# Patient Record
Sex: FEMALE | Race: WHITE | Employment: UNEMPLOYED | URBAN - METROPOLITAN AREA
[De-identification: names, ages, dates, MRNs, and addresses within clinical notes are randomized per-mention and may not be internally consistent; named-entity substitution may affect disease eponyms.]

---

## 2017-01-16 ENCOUNTER — ALLSCRIPTS OFFICE VISIT (OUTPATIENT)
Dept: OTHER | Facility: OTHER | Age: 57
End: 2017-01-16

## 2017-01-16 DIAGNOSIS — Z12.11 ENCOUNTER FOR SCREENING FOR MALIGNANT NEOPLASM OF COLON: ICD-10-CM

## 2017-02-03 ENCOUNTER — HOSPITAL ENCOUNTER (OUTPATIENT)
Dept: RADIOLOGY | Facility: HOSPITAL | Age: 57
Discharge: HOME | End: 2017-02-03

## 2017-02-03 ENCOUNTER — TRANSCRIBE ORDERS (OUTPATIENT)
Dept: ADMINISTRATIVE | Facility: HOSPITAL | Age: 57
End: 2017-02-03

## 2017-02-03 DIAGNOSIS — S22.41XA CLOSED FRACTURE OF MULTIPLE RIBS OF RIGHT SIDE, INITIAL ENCOUNTER: ICD-10-CM

## 2017-02-03 DIAGNOSIS — S22.41XA CLOSED FRACTURE OF MULTIPLE RIBS OF RIGHT SIDE, INITIAL ENCOUNTER: Primary | ICD-10-CM

## 2017-02-03 PROCEDURE — 71020 HB CHEST X-RAY 2VW FRONTAL&LATL: CPT

## 2017-02-09 ENCOUNTER — ALLSCRIPTS OFFICE VISIT (OUTPATIENT)
Dept: OTHER | Facility: OTHER | Age: 57
End: 2017-02-09

## 2017-04-23 ENCOUNTER — APPOINTMENT (OUTPATIENT)
Dept: URGENT CARE | Age: 57
End: 2017-04-23
Payer: COMMERCIAL

## 2017-04-23 PROCEDURE — 99283 EMERGENCY DEPT VISIT LOW MDM: CPT | Performed by: FAMILY MEDICINE

## 2017-04-23 PROCEDURE — G0382 LEV 3 HOSP TYPE B ED VISIT: HCPCS | Performed by: FAMILY MEDICINE

## 2017-05-10 ENCOUNTER — APPOINTMENT (OUTPATIENT)
Dept: PHYSICAL THERAPY | Facility: CLINIC | Age: 57
End: 2017-05-10
Payer: COMMERCIAL

## 2017-05-10 PROCEDURE — 97162 PT EVAL MOD COMPLEX 30 MIN: CPT

## 2017-05-10 PROCEDURE — 97110 THERAPEUTIC EXERCISES: CPT

## 2017-05-15 ENCOUNTER — APPOINTMENT (OUTPATIENT)
Dept: PHYSICAL THERAPY | Facility: CLINIC | Age: 57
End: 2017-05-15
Payer: COMMERCIAL

## 2017-05-15 PROCEDURE — 97140 MANUAL THERAPY 1/> REGIONS: CPT

## 2017-05-15 PROCEDURE — 97110 THERAPEUTIC EXERCISES: CPT

## 2017-05-17 ENCOUNTER — APPOINTMENT (OUTPATIENT)
Dept: PHYSICAL THERAPY | Facility: CLINIC | Age: 57
End: 2017-05-17
Payer: COMMERCIAL

## 2017-05-17 PROCEDURE — 97110 THERAPEUTIC EXERCISES: CPT

## 2017-05-22 ENCOUNTER — APPOINTMENT (OUTPATIENT)
Dept: PHYSICAL THERAPY | Facility: CLINIC | Age: 57
End: 2017-05-22
Payer: COMMERCIAL

## 2017-05-24 ENCOUNTER — APPOINTMENT (OUTPATIENT)
Dept: PHYSICAL THERAPY | Facility: CLINIC | Age: 57
End: 2017-05-24
Payer: COMMERCIAL

## 2017-05-31 ENCOUNTER — APPOINTMENT (OUTPATIENT)
Dept: PHYSICAL THERAPY | Facility: CLINIC | Age: 57
End: 2017-05-31
Payer: COMMERCIAL

## 2017-07-26 ENCOUNTER — ALLSCRIPTS OFFICE VISIT (OUTPATIENT)
Dept: OTHER | Facility: OTHER | Age: 57
End: 2017-07-26

## 2017-07-26 DIAGNOSIS — G89.29 OTHER CHRONIC PAIN: ICD-10-CM

## 2017-07-26 DIAGNOSIS — Z12.31 ENCOUNTER FOR SCREENING MAMMOGRAM FOR MALIGNANT NEOPLASM OF BREAST: ICD-10-CM

## 2017-07-26 DIAGNOSIS — F32.9 MAJOR DEPRESSIVE DISORDER, SINGLE EPISODE: ICD-10-CM

## 2017-07-26 DIAGNOSIS — M81.0 AGE-RELATED OSTEOPOROSIS WITHOUT CURRENT PATHOLOGICAL FRACTURE: ICD-10-CM

## 2017-09-12 DIAGNOSIS — Z00.00 ENCOUNTER FOR GENERAL ADULT MEDICAL EXAMINATION WITHOUT ABNORMAL FINDINGS: ICD-10-CM

## 2017-09-15 ENCOUNTER — ALLSCRIPTS OFFICE VISIT (OUTPATIENT)
Dept: OTHER | Facility: OTHER | Age: 57
End: 2017-09-15

## 2017-09-18 ENCOUNTER — ALLSCRIPTS OFFICE VISIT (OUTPATIENT)
Dept: OTHER | Facility: OTHER | Age: 57
End: 2017-09-18

## 2017-10-24 ENCOUNTER — HOSPITAL ENCOUNTER (OUTPATIENT)
Dept: RADIOLOGY | Age: 57
Discharge: HOME | End: 2017-10-24
Payer: COMMERCIAL

## 2017-10-24 DIAGNOSIS — Z12.31 ENCOUNTER FOR SCREENING MAMMOGRAM FOR MALIGNANT NEOPLASM OF BREAST: ICD-10-CM

## 2017-10-24 DIAGNOSIS — M81.0 AGE-RELATED OSTEOPOROSIS WITHOUT CURRENT PATHOLOGICAL FRACTURE: ICD-10-CM

## 2017-10-24 PROCEDURE — G0202 SCR MAMMO BI INCL CAD: HCPCS

## 2017-10-24 PROCEDURE — 77080 DXA BONE DENSITY AXIAL: CPT

## 2018-01-12 VITALS
OXYGEN SATURATION: 97 % | HEART RATE: 100 BPM | WEIGHT: 130.5 LBS | DIASTOLIC BLOOD PRESSURE: 80 MMHG | HEIGHT: 62 IN | SYSTOLIC BLOOD PRESSURE: 130 MMHG | BODY MASS INDEX: 24.01 KG/M2

## 2018-01-14 VITALS
DIASTOLIC BLOOD PRESSURE: 72 MMHG | WEIGHT: 118 LBS | HEIGHT: 62 IN | SYSTOLIC BLOOD PRESSURE: 132 MMHG | OXYGEN SATURATION: 97 % | BODY MASS INDEX: 21.71 KG/M2 | HEART RATE: 74 BPM
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
## 2018-01-16 NOTE — PROGRESS NOTES
Chief Complaint  ppd admin      Active Problems    1  Alcohol withdrawal seizure (291 81,780 39) (F10 239,R56 9)   2  Cervical transverse process fracture (805 00) (S12 9XXA)   3  Depression (311) (F32 9)   4  Encounter for mammogram to establish baseline mammogram (V76 12) (Z12 31)   5  Encounter for screening colonoscopy (V76 51) (Z12 11)   6  Grief (309 0) (F43 20)   7  Osteoporosis (733 00) (M81 0)   8  Other chronic pain (338 29) (G89 29)   9  Panic disorder without agoraphobia (300 01) (F41 0)   10  Rib fractures (807 00) (S22 39XA)   11  Shingles (053 9) (B02 9)   12  Tobacco use (305 1) (Z72 0)    Current Meds   1  Ibuprofen 800 MG Oral Tablet; TAKE ONE TABLET BY MOUTH THREE TIMES DAILY   AFTER  MEALS; Therapy: 84WMC6028 to (Evaluate:14Oct2017)  Requested for: 19Xrf6843; Last   Rx:41Fnv4584; Status: ACTIVE - Transmit to Pharmacy - Awaiting Verification Ordered   2  Lidocaine 5 % External Ointment; APPLY TO AFFECTED AREAS AS DIRECTED; Therapy: 21XWC5285 to (Last Rx:61Kgj9775)  Requested for: 52FQH8261 Ordered   3  Methocarbamol 500 MG Oral Tablet; TAKE 2 TABLETS 4 TIMES DAILY AS NEEDED; Therapy: 15CFY7441 to (Evaluate:13Nov2017)  Requested for: 22Oxb5302; Last   Rx:25Haw5876; Status: ACTIVE - Transmit to Pharmacy - Awaiting Verification Ordered   4  Sertraline HCl - 100 MG Oral Tablet; TAKE ONE tablet(s) DAILY as directed; Therapy: 80LXY4018 to (Evaluate:19Oct2017)  Requested for: 57Opq2872; Last   Rx:60Zxa7812 Ordered    Allergies    1  No Known Drug Allergies    Future Appointments    Date/Time Provider Specialty Site   01/29/2018 09:20 BRANDO Cottrell   Internal Medicine [facility]   09/18/2017 09:30 AM YURIDIA Riley Nurse Schedule  [facility]     Signatures   Electronically signed by : BRANDO Rojas ; Sep 15 2017  1:34PM EST                       (Author)

## 2018-01-18 NOTE — PROGRESS NOTES
Chief Complaint  negative ppd read      Active Problems    1  Alcohol withdrawal seizure (291 81,780 39) (F10 239,R56 9)   2  Cervical transverse process fracture (805 00) (S12 9XXA)   3  Depression (311) (F32 9)   4  Diphtheria-pertussis-tetanus (DPT) vaccination status unknown (V49 89) (Z78 9)   5  Encounter for mammogram to establish baseline mammogram (V76 12) (Z12 31)   6  Encounter for screening colonoscopy (V76 51) (Z12 11)   7  Grief (309 0) (F43 20)   8  Osteoporosis (733 00) (M81 0)   9  Other chronic pain (338 29) (G89 29)   10  Panic disorder without agoraphobia (300 01) (F41 0)   11  Rib fractures (807 00) (S22 39XA)   12  Shingles (053 9) (B02 9)   13  Tobacco use (305 1) (Z72 0)    Current Meds   1  Ibuprofen 800 MG Oral Tablet; TAKE ONE TABLET BY MOUTH THREE TIMES DAILY   AFTER  MEALS; Therapy: 10ZVM5984 to (Evaluate:14Oct2017)  Requested for: 87Ipc2530; Last   Rx:10Vva1925; Status: ACTIVE - Transmit to Pharmacy - Awaiting Verification Ordered   2  Lidocaine 5 % External Ointment; APPLY TO AFFECTED AREAS AS DIRECTED; Therapy: 77OAH3109 to (Last Rx:30Hlp2217)  Requested for: 34PWC9891 Ordered   3  Methocarbamol 500 MG Oral Tablet; TAKE 2 TABLETS 4 TIMES DAILY AS NEEDED; Therapy: 63IHA6716 to (Evaluate:13Nov2017)  Requested for: 31Vnw3928; Last   Rx:41Tdf4342; Status: ACTIVE - Transmit to Pharmacy - Awaiting Verification Ordered   4  Sertraline HCl - 100 MG Oral Tablet; TAKE ONE tablet(s) DAILY as directed; Therapy: 36OOM2687 to (Evaluate:19Oct2017)  Requested for: 99Hlx3427; Last   Rx:85Ipb1587 Ordered    Allergies    1  No Known Drug Allergies    Plan  Depression    · Sertraline HCl - 100 MG Oral Tablet; TAKE 1 AND 1/2 TABLETS AT BEDTIME  Diphtheria-pertussis-tetanus (DPT) vaccination status unknown    · Tubersol 5 UNIT/0 1ML Intradermal Solution    Future Appointments    Date/Time Provider Specialty Site   01/29/2018 09:20 BRANDO Prado   Internal Medicine [facility] Signatures   Electronically signed by : BRANDO Valdes ; Sep 19 2017  9:35PM EST                       (Author)

## 2018-01-22 VITALS
HEART RATE: 75 BPM | HEIGHT: 62 IN | BODY MASS INDEX: 24.11 KG/M2 | SYSTOLIC BLOOD PRESSURE: 130 MMHG | DIASTOLIC BLOOD PRESSURE: 70 MMHG | OXYGEN SATURATION: 98 % | TEMPERATURE: 97 F | WEIGHT: 131 LBS

## 2018-01-29 ENCOUNTER — OFFICE VISIT (OUTPATIENT)
Dept: INTERNAL MEDICINE CLINIC | Facility: CLINIC | Age: 58
End: 2018-01-29

## 2018-01-30 DIAGNOSIS — R52 PAIN: Primary | ICD-10-CM

## 2018-01-30 RX ORDER — IBUPROFEN 800 MG/1
TABLET ORAL
Qty: 90 TABLET | Refills: 0 | Status: SHIPPED | OUTPATIENT
Start: 2018-01-30 | End: 2018-02-07 | Stop reason: SDUPTHER

## 2018-02-07 ENCOUNTER — OFFICE VISIT (OUTPATIENT)
Dept: URGENT CARE | Age: 58
End: 2018-02-07
Payer: COMMERCIAL

## 2018-02-07 VITALS
SYSTOLIC BLOOD PRESSURE: 188 MMHG | WEIGHT: 116 LBS | TEMPERATURE: 98.2 F | DIASTOLIC BLOOD PRESSURE: 86 MMHG | HEIGHT: 62 IN | RESPIRATION RATE: 18 BRPM | OXYGEN SATURATION: 97 % | BODY MASS INDEX: 21.35 KG/M2 | HEART RATE: 86 BPM

## 2018-02-07 DIAGNOSIS — M79.641 HAND PAIN, RIGHT: ICD-10-CM

## 2018-02-07 DIAGNOSIS — R52 PAIN: ICD-10-CM

## 2018-02-07 DIAGNOSIS — M25.531 WRIST PAIN, RIGHT: ICD-10-CM

## 2018-02-07 DIAGNOSIS — S52.591A OTHER CLOSED FRACTURE OF DISTAL END OF RIGHT RADIUS, INITIAL ENCOUNTER: Primary | ICD-10-CM

## 2018-02-07 PROBLEM — S52.501A CLOSED FRACTURE OF RIGHT DISTAL RADIUS: Status: ACTIVE | Noted: 2018-02-07

## 2018-02-07 PROCEDURE — 73130 X-RAY EXAM OF HAND: CPT

## 2018-02-07 PROCEDURE — 99283 EMERGENCY DEPT VISIT LOW MDM: CPT | Performed by: FAMILY MEDICINE

## 2018-02-07 PROCEDURE — 29125 APPL SHORT ARM SPLINT STATIC: CPT | Performed by: FAMILY MEDICINE

## 2018-02-07 PROCEDURE — 73110 X-RAY EXAM OF WRIST: CPT

## 2018-02-07 PROCEDURE — G0382 LEV 3 HOSP TYPE B ED VISIT: HCPCS | Performed by: FAMILY MEDICINE

## 2018-02-07 RX ORDER — IBUPROFEN 800 MG/1
800 TABLET ORAL
Qty: 90 TABLET | Refills: 0 | Status: SHIPPED | OUTPATIENT
Start: 2018-02-07 | End: 2018-03-06 | Stop reason: SDUPTHER

## 2018-02-07 RX ORDER — SERTRALINE HYDROCHLORIDE 100 MG/1
100 TABLET, FILM COATED ORAL
COMMUNITY
End: 2018-03-27 | Stop reason: SDUPTHER

## 2018-02-07 RX ORDER — AZITHROMYCIN 250 MG/1
TABLET, FILM COATED ORAL
COMMUNITY
Start: 2017-12-04 | End: 2018-03-06 | Stop reason: ALTCHOICE

## 2018-02-07 RX ORDER — OXYCODONE HYDROCHLORIDE AND ACETAMINOPHEN 5; 325 MG/1; MG/1
1 TABLET ORAL EVERY 6 HOURS PRN
Qty: 20 TABLET | Refills: 0 | Status: SHIPPED | OUTPATIENT
Start: 2018-02-07 | End: 2018-03-06 | Stop reason: ALTCHOICE

## 2018-02-07 RX ORDER — METHOCARBAMOL 500 MG/1
2 TABLET, FILM COATED ORAL 4 TIMES DAILY PRN
COMMUNITY
Start: 2017-03-02 | End: 2018-03-27 | Stop reason: SDUPTHER

## 2018-02-07 RX ORDER — IBUPROFEN 600 MG/1
600 TABLET ORAL EVERY 6 HOURS
COMMUNITY
Start: 2017-01-28 | End: 2018-03-06 | Stop reason: SDUPTHER

## 2018-02-07 NOTE — PATIENT INSTRUCTIONS
Wrist Fracture in Adults   WHAT YOU NEED TO KNOW:   A wrist fracture is a break in one or more of the bones in your wrist    DISCHARGE INSTRUCTIONS:   Return to the emergency department if:   · Your pain gets worse or does not get better after you take pain medicine  · Your cast or splint breaks, gets wet, or is damaged  · Your hand or fingers feel numb or cold  · Your hand or fingers turn white or blue  · Your splint or cast feels too tight  · You have more pain or swelling after the cast or splint is put on  Contact your healthcare provider if:   · You have a fever  · There is a foul smell or blood coming from under the cast     · You have questions or concerns about your condition or care  Medicines:   · Prescription pain medicine  may be given  Ask your healthcare provider how to take this medicine safely  Some prescription pain medicines contain acetaminophen  Do not take other medicines that contain acetaminophen without talking to your healthcare provider  Too much acetaminophen may cause liver damage  Prescription pain medicine may cause constipation  Ask your healthcare provider how to prevent or treat constipation  · NSAIDs , such as ibuprofen, help decrease swelling, pain, and fever  NSAIDs can cause stomach bleeding or kidney problems in certain people  If you take blood thinner medicine, always ask your healthcare provider if NSAIDs are safe for you  Always read the medicine label and follow directions  · Acetaminophen  decreases pain and fever  It is available without a doctor's order  Ask how much to take and how often to take it  Follow directions  Read the labels of all other medicines you are using to see if they also contain acetaminophen, or ask your doctor or pharmacist  Acetaminophen can cause liver damage if not taken correctly  Do not use more than 4 grams (4,000 milligrams) total of acetaminophen in one day  · Take your medicine as directed    Contact your healthcare provider if you think your medicine is not helping or if you have side effects  Tell him or her if you are allergic to any medicine  Keep a list of the medicines, vitamins, and herbs you take  Include the amounts, and when and why you take them  Bring the list or the pill bottles to follow-up visits  Carry your medicine list with you in case of an emergency  Self-care:   · Rest  as much as possible  Do not play contact sports until the healthcare provider says it is okay  · Apply ice  on your wrist for 15 to 20 minutes every hour or as directed  Use an ice pack, or put crushed ice in a plastic bag  Cover it with a towel before you place it on your skin  Ice helps prevent tissue damage and decreases swelling and pain  · Elevate  your wrist above the level of your heart as often as possible  This will help decrease swelling and pain  Prop your wrist on pillows or blankets to keep it elevated comfortably  Cast or splint care:   · You may take a bath or shower as directed  Do not let your cast or splint get wet  Before bathing, cover the cast or splint with 2 plastic trash bags  Tape the bags to your skin above the cast or splint to seal out the water  Keep your arm out of the water in case the bag breaks  If a plaster cast gets wet and soft, call your healthcare provider  · Check the skin around the cast or splint every day  You may put lotion on any red or sore areas  · Do not push down or lean on the cast or brace because it may break  · Do not  scratch the skin under the cast by putting a sharp or pointed object inside the cast   Go to physical therapy as directed: You may need physical therapy after your wrist heals and the cast is removed  A physical therapist can teach you exercises to help improve movement and strength and to decrease pain  Follow up with your healthcare provider or bone specialist as directed: You may need to return to have your cast removed   You may also need an x-ray to check how well the bone has healed  Write down your questions so you remember to ask them during your visits  © 2017 [employer] Information is for End User's use only and may not be sold, redistributed or otherwise used for commercial purposes  All illustrations and images included in CareNotes® are the copyrighted property of A D A M , Inc  or [employer]  The above information is an  only  It is not intended as medical advice for individual conditions or treatments  Talk to your doctor, nurse or pharmacist before following any medical regimen to see if it is safe and effective for you  Call [facility] [address] in the morning for a follow-up appointment   [phone]     Ice and elevate frequently

## 2018-02-07 NOTE — PROGRESS NOTES
[facility]        NAME: Tmi Kenyon is a 62 y o  female  : 1960    MRN: [id number]  DATE: 2018  TIME: 4:16 PM    Assessment and Plan   Hand pain, right [M79 641]  1  Hand pain, right  X-ray hand right 3+ views   2  Wrist pain, right  XR wrist 3+ vw right         Patient Instructions     Follow up with PCP in 3-5 days  Proceed to  ER if symptoms worsen  Chief Complaint     Chief Complaint   Patient presents with    Wrist Injury     she trip over and fell down hurt right wrist  it happen today  History of Present Illness   Tim Kenyon presents to the clinic c/o    Patient tripped and fell at home landing on her sofa and came down on her right outstretched arm and heard a crack on her right wrist   Patient has pain in the hand and wrist   She does have a history of osteoporosis and is concerned about possible fracture  Review of Systems   Review of Systems   Constitutional: Negative  Musculoskeletal: Positive for joint swelling  Skin: Negative  Neurological: Negative for numbness           Current Medications     Long-Term Prescriptions   Medication Sig Dispense Refill    ibuprofen (MOTRIN) 600 mg tablet Take 600 mg by mouth every 6 (six) hours      ibuprofen (MOTRIN) 800 mg tablet TAKE ONE TABLET BY MOUTH THREE TIMES DAILY AFTER  MEALS 90 tablet 0    methocarbamol (ROBAXIN) 500 mg tablet Take 2 tablets by mouth 4 (four) times a day as needed      sertraline (ZOLOFT) 100 mg tablet Take 100 mg by mouth         Current Allergies     Allergies as of 2018    (No Known Allergies)            The following portions of the patient's history were reviewed and updated as appropriate: allergies, current medications, past family history, past medical history, past social history, past surgical history and problem list     Objective   BP (!) 188/86   Pulse 86   Temp 98 2 °F (36 8 °C) (Temporal)   Resp 18   Ht 5' 2" (1 575 m)   Wt 52 6 kg (116 lb) LMP  (LMP Unknown)   SpO2 97%   BMI 21 22 kg/m²        Physical Exam     Physical Exam   Constitutional: She is oriented to person, place, and time  She appears well-developed and well-nourished  Musculoskeletal: She exhibits tenderness (Right hand and right wrist)  Patient with limited range of motion of the right hand and wrist due to pain with motion  There is mild focal soft tissue swelling  Neurological: She is alert and oriented to person, place, and time  Skin: Skin is warm and dry  Psychiatric: She has a normal mood and affect  Her behavior is normal    Nursing note and vitals reviewed  Splint application  Date/Time: 2/7/2018 5:02 PM  Performed by: Samantha Breath  Authorized by: Samantha Breath     Consent:     Consent obtained:  Verbal    Consent given by:  Patient    Risks discussed:  Swelling, pain and numbness  Pre-procedure details:     Sensation:  Normal  Procedure details:     Laterality:  Right    Location:  Wrist    Wrist:  R wrist    Splint type:  Short arm splint, static (forearm to hand)    Supplies:  Ortho-Glass and elastic bandage  Post-procedure details:     Pain:  Unchanged    Sensation:  Normal    Patient tolerance of procedure:   Tolerated well, no immediate complications

## 2018-02-12 ENCOUNTER — APPOINTMENT (OUTPATIENT)
Dept: RADIOLOGY | Facility: CLINIC | Age: 58
End: 2018-02-12
Payer: COMMERCIAL

## 2018-02-12 ENCOUNTER — OFFICE VISIT (OUTPATIENT)
Dept: OBGYN CLINIC | Facility: MEDICAL CENTER | Age: 58
End: 2018-02-12
Payer: COMMERCIAL

## 2018-02-12 VITALS — HEIGHT: 62 IN | SYSTOLIC BLOOD PRESSURE: 185 MMHG | DIASTOLIC BLOOD PRESSURE: 74 MMHG | HEART RATE: 80 BPM

## 2018-02-12 DIAGNOSIS — S52.501A CLOSED FRACTURE OF DISTAL END OF RIGHT RADIUS, UNSPECIFIED FRACTURE MORPHOLOGY, INITIAL ENCOUNTER: ICD-10-CM

## 2018-02-12 DIAGNOSIS — S62.101A CLOSED FRACTURE OF RIGHT WRIST, INITIAL ENCOUNTER: Primary | ICD-10-CM

## 2018-02-12 PROCEDURE — 25600 CLTX DST RDL FX/EPHYS SEP WO: CPT | Performed by: FAMILY MEDICINE

## 2018-02-12 PROCEDURE — 73110 X-RAY EXAM OF WRIST: CPT

## 2018-02-12 PROCEDURE — 99204 OFFICE O/P NEW MOD 45 MIN: CPT | Performed by: FAMILY MEDICINE

## 2018-02-12 NOTE — PATIENT INSTRUCTIONS
Start short-arm cast  reviewed cast precautions including instruction not to wet cast  instructed if any swelling, pain, numbness, tingling then to contact office immediately for instruction or go to ER if physician unavailable  reviewed risks of SAC including wrist stiffness, skin breakdown, ulceration, risk of infection if wedging objects behind cast  patient expressed understanding and agreed to plan

## 2018-02-12 NOTE — LETTER
February 12, 2018     Patient: Ailynjoseph Ariane   YOB: 1960   Date of Visit: 2/12/2018       To Whom it May Concern:    Ruddy Henry is under my professional care  She was seen in my office on 2/12/2018  She is to not bear any weight or use right upper extremity  She may return to work on 02/18/2018  At that time I recommend work restrictions to include no use of right upper extremity  She is not to lift anything with her right hand or carrying with the right hand  If you have any questions or concerns, please don't hesitate to call           Sincerely,          Sulma Nickerson III, DO        CC: Gloria Thakkar

## 2018-02-12 NOTE — PROGRESS NOTES
1  Closed fracture of right wrist, initial encounter  XR wrist 3+ vw right   2  Closed fracture of distal end of right radius, unspecified fracture morphology, initial encounter       Orders Placed This Encounter   Procedures    XR wrist 3+ vw right        Imaging Studies (I personally reviewed results in PACS):  X-ray right wrist 02/07/2018:  Nondisplaced transverse fracture distal radius  X-ray right wrist 02/12/2018:  Maintenance of alignment of nondisplaced transverse fracture distal radius    IMPRESSION:  Right hand dominant  Right distal radius fracture nondisplaced  Date of injury 02/07/2018  Date of immobilization:  02/07/2018 or urgent care splint  Follow-up interval:  5 days          Return in about 2 weeks (around 2/26/2018)  Patient Instructions   Start short-arm cast  reviewed cast precautions including instruction not to wet cast  instructed if any swelling, pain, numbness, tingling then to contact office immediately for instruction or go to ER if physician unavailable  reviewed risks of SAC including wrist stiffness, skin breakdown, ulceration, risk of infection if wedging objects behind cast  patient expressed understanding and agreed to plan  Recommend patient continue her vitamin-D and calcium supplements  Educated patient risk of delayed healing with continued smoking        CHIEF COMPLAINT:  Follow-up right wrist fracture    HPI:  Christin Taylor is a 62 y o  female  who presents for follow-up from urgent care for right wrist fracture  X-ray from 02/07/2018 shows nondisplaced transverse fracture distal radius  Patient tripped at home plan forward on her couch with fall on outstretched hand heard a crack in her right wrist   She did present to urgent care where she was placed in a splint      today, patient states that her pain is relatively well controlled  She states that her swelling is significantly reduced since her fall    She does detail history of osteoporosis to me from taking infertility medication in the past   She has a history of multiple fragility fractures including in the recent last 1 year 3 compression fractures of her vertebrae as well as rib fractures  Review of Systems   Constitutional: Negative for chills, fever and unexpected weight change  HENT: Negative for hearing loss, nosebleeds and sore throat  Eyes: Negative for pain, redness and visual disturbance  Respiratory: Negative for cough, shortness of breath and wheezing  Cardiovascular: Negative for chest pain, palpitations and leg swelling  Gastrointestinal: Negative for abdominal distention, nausea and vomiting  Endocrine: Negative for polydipsia and polyuria  Genitourinary: Negative for dysuria and hematuria  Skin: Negative for rash and wound  Neurological: Negative for dizziness, numbness and headaches  Psychiatric/Behavioral: Negative for decreased concentration and suicidal ideas  Following history reviewed and update:    Past Medical History:   Diagnosis Date    History of endometriosis     Osteoporosis     Polycystic ovarian syndrome      Past Surgical History:   Procedure Laterality Date    SHOULDER SURGERY      TOTAL ABDOMINAL HYSTERECTOMY W/ BILATERAL SALPINGOOPHORECTOMY      TUBAL LIGATION       Social History   History   Alcohol Use    Yes     Comment: social     History   Drug Use No     History   Smoking Status    Current Every Day Smoker   Smokeless Tobacco    Never Used     Comment: tobacco use     Family History   Problem Relation Age of Onset    Peripheral vascular disease Mother     Other Mother      peripheral arterial disease     No Known Allergies       Physical Exam   Constitutional: She is oriented to person, place, and time  She appears well-developed and well-nourished  HENT:   Head: Normocephalic and atraumatic     Right Ear: External ear normal    Left Ear: External ear normal    Nose: Nose normal    Eyes: Conjunctivae are normal  Right eye exhibits no discharge  Left eye exhibits no discharge  No scleral icterus  Neck: Neck supple  Pulmonary/Chest: Effort normal  No respiratory distress  Neurological: She is alert and oriented to person, place, and time  Psychiatric: She has a normal mood and affect   Her behavior is normal  Judgment and thought content normal        Ortho Exam    Right Elbow:  no swelling, erythema, or increased warmth  rom full  nontender  no laxity of joint    Right Wrist  no erythema, or increased warmth  1+ swelling nonpitting  Positive tenderness but volar distal radius      Right Hand  no erythema  swelling:  None  tenderness:  None  rom fingers mcp, pip, dip intact without pain  No digital scissoring or deviation of fingers  no extensor lag  no rotation of fingers  no joint laxity  strenght flexion and extension mcp, pip, dip 5/5  sensation intact  capillary refill intact   Procedures

## 2018-02-27 ENCOUNTER — TELEPHONE (OUTPATIENT)
Dept: OBGYN CLINIC | Facility: HOSPITAL | Age: 58
End: 2018-02-27

## 2018-02-27 NOTE — TELEPHONE ENCOUNTER
Pt has an appt Friday but was asking if she could come in today or tomorrow but I told her that dr Charletta Burkitt is in [place] on those two days  She would like a call from the office   She can be reached at [phone]

## 2018-02-27 NOTE — TELEPHONE ENCOUNTER
I spoke with patient  She was requesting earlier appointment due to scheduling conflicts with her relatives care  Explained the patient I do want to see her so I can examine her wrist ensure that there is no issue  Patient expressed understanding agreed to make appointment on Friday

## 2018-03-02 ENCOUNTER — OFFICE VISIT (OUTPATIENT)
Dept: OBGYN CLINIC | Facility: MEDICAL CENTER | Age: 58
End: 2018-03-02

## 2018-03-02 ENCOUNTER — APPOINTMENT (OUTPATIENT)
Dept: RADIOLOGY | Facility: CLINIC | Age: 58
End: 2018-03-02
Payer: COMMERCIAL

## 2018-03-02 VITALS
HEART RATE: 74 BPM | DIASTOLIC BLOOD PRESSURE: 73 MMHG | SYSTOLIC BLOOD PRESSURE: 150 MMHG | HEIGHT: 62 IN | WEIGHT: 112 LBS | BODY MASS INDEX: 20.61 KG/M2

## 2018-03-02 DIAGNOSIS — S52.501D CLOSED FRACTURE OF DISTAL END OF RIGHT RADIUS WITH ROUTINE HEALING, UNSPECIFIED FRACTURE MORPHOLOGY, SUBSEQUENT ENCOUNTER: Primary | ICD-10-CM

## 2018-03-02 PROCEDURE — 99024 POSTOP FOLLOW-UP VISIT: CPT | Performed by: FAMILY MEDICINE

## 2018-03-02 PROCEDURE — 73110 X-RAY EXAM OF WRIST: CPT

## 2018-03-02 NOTE — LETTER
March 2, 2018     Patient: Rohini Ervin   YOB: 1960   Date of Visit: 3/2/2018       To Whom it May Concern:    Enrique Janny is under my professional care  She was seen in my office on 3/2/2018  She may return to work on In 3/2/18       If you have any questions or concerns, please don't hesitate to call           Sincerely,          Radha Valdez III,         CC: Rohini Ervin

## 2018-03-02 NOTE — PROGRESS NOTES
1  Closed fracture of distal end of right radius with routine healing, unspecified fracture morphology, subsequent encounter  XR wrist 3+ vw right     Orders Placed This Encounter   Procedures    XR wrist 3+ vw right        Imaging Studies (I personally reviewed results in PACS):  X-ray right wrist 03/02/2018:  Maintenance of alignment  There is interval sclerosis of fracture line with callus formation  Past diagnostics:    X-ray right wrist 02/07/2018:  Nondisplaced transverse fracture distal radius  X-ray right wrist 02/12/2018:  Maintenance of alignment of nondisplaced transverse fracture distal radius    IMPRESSION:  Right hand dominant  Right distal radius fracture nondisplaced  Date of injury 02/07/2018  Date of immobilization:  02/07/2018 or urgent care splint  Follow-up interval:  3-4 weeks    Planned to have repeat x-rays next visit and transition to a brace        Return in about 2 weeks (around 3/16/2018)  Patient Instructions   Patient continue cast at this time   Educated patient risk of displacement of fracture she continues to use her arm  Explained that if her fracture moves then can result  stiffness in her wrist that may require surgery to fix  Patient expressed understanding agreed to plan  Recommend patient continue her vitamin-D and calcium supplements  Again Educated patient risk of delayed healing with continued smoking        CHIEF COMPLAINT:  Follow-up right wrist fracture    HPI:  Kay Faye is a 62 y o  female  who presents for follow-up from urgent care for right wrist fracture  X-ray from 02/07/2018 shows nondisplaced transverse fracture distal radius  Patient tripped at home plan forward on her couch with fall on outstretched hand heard a crack in her right wrist   She did present to urgent care where she was placed in a splint    Last visit patient was started on short-arm cast   She continues work restriction     today, states she has only pain at the end of work characterizes throbbing pain inside her cast   She 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  She has some stiffness in her wrist       Review of Systems   Constitutional: Negative for fever  Neurological: Negative for weakness  Following history reviewed and update:    Past Medical History:   Diagnosis Date    History of endometriosis     Osteoporosis     Polycystic ovarian syndrome      Past Surgical History:   Procedure Laterality Date    SHOULDER SURGERY      TOTAL ABDOMINAL HYSTERECTOMY W/ BILATERAL SALPINGOOPHORECTOMY      TUBAL LIGATION       Social History   History   Alcohol Use    Yes     Comment: social     History   Drug Use No     History   Smoking Status    Current Every Day Smoker   Smokeless Tobacco    Never Used     Comment: tobacco use     Family History   Problem Relation Age of Onset    Peripheral vascular disease Mother     Other Mother      peripheral arterial disease     No Known Allergies       Physical Exam   Constitutional: She is oriented to person, place, and time  She appears well-developed and well-nourished  HENT:   Head: Normocephalic and atraumatic  Right Ear: External ear normal    Left Ear: External ear normal    Nose: Nose normal    Eyes: Conjunctivae are normal  Right eye exhibits no discharge  Left eye exhibits no discharge  No scleral icterus  Neck: Neck supple  Pulmonary/Chest: Effort normal  No respiratory distress  Neurological: She is alert and oriented to person, place, and time  Psychiatric: She has a normal mood and affect   Her behavior is normal  Judgment and thought content normal        Ortho Exam    Right Elbow:  no swelling, erythema, or increased warmth  rom full  nontender  no laxity of joint    Right Wrist  no erythema, or increased warmth  No swelling  Nontender wrist  Range-of-motion fifty extension      Right Hand  no erythema  swelling:  None  tenderness:  None  rom fingers mcp, pip, dip intact without pain  No digital scissoring or deviation of fingers  no extensor lag  no rotation of fingers  no joint laxity  strenght flexion and extension mcp, pip, dip 5/5  sensation intact  capillary refill intact   Procedures

## 2018-03-02 NOTE — PATIENT INSTRUCTIONS
Patient continue cast at this time   Educated patient risk of displacement of fracture she continues to use her arm  Explained that if her fracture moves then can result  stiffness in her wrist that may require surgery to fix  Patient expressed understanding agreed to plan

## 2018-03-06 ENCOUNTER — OFFICE VISIT (OUTPATIENT)
Dept: INTERNAL MEDICINE CLINIC | Facility: CLINIC | Age: 58
End: 2018-03-06
Payer: COMMERCIAL

## 2018-03-06 VITALS
SYSTOLIC BLOOD PRESSURE: 140 MMHG | BODY MASS INDEX: 21.57 KG/M2 | DIASTOLIC BLOOD PRESSURE: 70 MMHG | HEIGHT: 62 IN | HEART RATE: 75 BPM | RESPIRATION RATE: 16 BRPM | TEMPERATURE: 98.8 F | OXYGEN SATURATION: 98 % | WEIGHT: 117.2 LBS

## 2018-03-06 DIAGNOSIS — Z13.220 SCREENING, LIPID: ICD-10-CM

## 2018-03-06 DIAGNOSIS — M80.00XD OSTEOPOROSIS WITH CURRENT PATHOLOGICAL FRACTURE WITH ROUTINE HEALING, UNSPECIFIED OSTEOPOROSIS TYPE, SUBSEQUENT ENCOUNTER: Primary | ICD-10-CM

## 2018-03-06 DIAGNOSIS — R52 PAIN: ICD-10-CM

## 2018-03-06 PROCEDURE — 99214 OFFICE O/P EST MOD 30 MIN: CPT | Performed by: INTERNAL MEDICINE

## 2018-03-06 RX ORDER — MULTIVITAMIN
1 CAPSULE ORAL DAILY
COMMUNITY

## 2018-03-06 RX ORDER — IBUPROFEN 800 MG/1
800 TABLET ORAL
Qty: 90 TABLET | Refills: 0 | Status: SHIPPED | OUTPATIENT
Start: 2018-03-06 | End: 2018-04-17 | Stop reason: SDUPTHER

## 2018-03-06 NOTE — PROGRESS NOTES
Assessment/Plan:    No problem-specific Assessment & Plan notes found for this encounter  Diagnoses and all orders for this visit:    Osteoporosis with current pathological fracture with routine healing, unspecified osteoporosis type, subsequent encounter  -     Comprehensive metabolic panel  -     CBC and differential  -     TSH, 3rd generation with T4 reflex  -     Vitamin D 25 hydroxy; Future  -     PTH, intact; Future  -     Ambulatory referral to Rheumatology; Future    Screening, lipid  -     Lipid panel    Pain  -     ibuprofen (MOTRIN) 800 mg tablet; Take 1 tablet (800 mg total) by mouth 3 (three) times daily after meals    Other orders  -     Multiple Vitamin (MULTIVITAMIN) capsule; Take 1 capsule by mouth daily  -     cyanocobalamin 250 MCG tablet; Take 250 mcg by mouth daily      Valentin Aviles was seen and examined in the office today  With her known osteoporosis (last scan in 2017) along with multiple fractures (though traumatic), I do think it's a good idea for her to be seen by Rheumatology for options as well  Blood work was also requested to rule out secondary causes  There is a family history of breast and colon cancer and she was reminded to have her colonoscopy as well  No other changes were made  Subjective:      Patient ID: Gloria Thakkar is a 62 y o  female  Valentin Aviles is here today for a follow up  To summarize, she has known osteoporosis and has previously on a bisphosphonate from my understanding  She reports that she was told to stop it secondary to how long she was on it (assuming secondary to the finding of atypical fractures associated with long term use)  She has had multiple fractures in her past which have been traumatic in nature  She is currently suffering from a wrist fracture of the right wrist that occurred while falling forward   She has had previous cervical spine fracture, multiple rib fractures (secondary to trauma), and I believer another fracture involving the back as well    Denies a history of kidney stones, abdominal pain, or other complaints  The following portions of the patient's history were reviewed and updated as appropriate: current medications, past family history, past medical history, past social history, past surgical history and problem list     Review of Systems   Constitutional: Negative for chills, fever and unexpected weight change  Respiratory: Negative for cough, chest tightness and shortness of breath  Cardiovascular: Negative for chest pain and palpitations  Gastrointestinal: Negative for abdominal pain, diarrhea, nausea and vomiting  Genitourinary: Negative for difficulty urinating  Musculoskeletal: Positive for arthralgias  Neurological: Negative for dizziness, numbness and headaches  Psychiatric/Behavioral: Negative for dysphoric mood and sleep disturbance  The patient is not nervous/anxious  Objective:      /70 (BP Location: Left arm, Patient Position: Sitting, Cuff Size: Adult)   Pulse 75   Temp 98 8 °F (37 1 °C) (Tympanic)   Resp 16   Ht 5' 2" (1 575 m)   Wt 53 2 kg (117 lb 3 2 oz)   LMP  (LMP Unknown)   SpO2 98%   BMI 21 44 kg/m²          Physical Exam   Constitutional: She is oriented to person, place, and time  She appears well-developed and well-nourished  Eyes: Conjunctivae are normal    Cardiovascular: Normal rate, regular rhythm and normal heart sounds  Pulmonary/Chest: Effort normal and breath sounds normal  No respiratory distress  She has no wheezes  Musculoskeletal:   Right wrist in splint   Neurological: She is alert and oriented to person, place, and time  No cranial nerve deficit  Skin: Skin is warm and dry  Psychiatric: She has a normal mood and affect  Her speech is normal and behavior is normal  Judgment and thought content normal    Vitals reviewed

## 2018-03-09 LAB
25(OH)D3 SERPL-MCNC: 28 NG/ML (ref 30–100)
ALBUMIN SERPL-MCNC: 4.8 G/DL (ref 3.6–5.1)
ALBUMIN/GLOB SERPL: 1.7 (CALC) (ref 1–2.5)
ALP SERPL-CCNC: 77 U/L (ref 33–130)
ALT SERPL-CCNC: 12 U/L (ref 6–29)
AST SERPL-CCNC: 13 U/L (ref 10–35)
BASOPHILS # BLD AUTO: 31 CELLS/UL (ref 0–200)
BASOPHILS NFR BLD AUTO: 0.3 %
BILIRUB SERPL-MCNC: 0.5 MG/DL (ref 0.2–1.2)
BUN SERPL-MCNC: 19 MG/DL (ref 7–25)
BUN/CREAT SERPL: ABNORMAL (CALC) (ref 6–22)
CALCIUM SERPL-MCNC: 10.5 MG/DL (ref 8.6–10.4)
CALCIUM SERPL-MCNC: 10.5 MG/DL (ref 8.6–10.4)
CHLORIDE SERPL-SCNC: 102 MMOL/L (ref 98–110)
CHOLEST SERPL-MCNC: 265 MG/DL
CHOLEST/HDLC SERPL: 2.7 (CALC)
CO2 SERPL-SCNC: 26 MMOL/L (ref 20–31)
CREAT SERPL-MCNC: 0.76 MG/DL (ref 0.5–1.05)
EOSINOPHIL # BLD AUTO: 103 CELLS/UL (ref 15–500)
EOSINOPHIL NFR BLD AUTO: 1 %
ERYTHROCYTE [DISTWIDTH] IN BLOOD BY AUTOMATED COUNT: 12 % (ref 11–15)
GLOBULIN SER CALC-MCNC: 2.8 G/DL (CALC) (ref 1.9–3.7)
GLUCOSE SERPL-MCNC: 86 MG/DL (ref 65–99)
HCT VFR BLD AUTO: 44.7 % (ref 35–45)
HDLC SERPL-MCNC: 98 MG/DL
HGB BLD-MCNC: 15.1 G/DL (ref 11.7–15.5)
LDLC SERPL CALC-MCNC: 148 MG/DL (CALC)
LYMPHOCYTES # BLD AUTO: 2039 CELLS/UL (ref 850–3900)
LYMPHOCYTES NFR BLD AUTO: 19.8 %
MCH RBC QN AUTO: 31.7 PG (ref 27–33)
MCHC RBC AUTO-ENTMCNC: 33.8 G/DL (ref 32–36)
MCV RBC AUTO: 93.9 FL (ref 80–100)
MONOCYTES # BLD AUTO: 453 CELLS/UL (ref 200–950)
MONOCYTES NFR BLD AUTO: 4.4 %
NEUTROPHILS # BLD AUTO: 7674 CELLS/UL (ref 1500–7800)
NEUTROPHILS NFR BLD AUTO: 74.5 %
NONHDLC SERPL-MCNC: 167 MG/DL (CALC)
PLATELET # BLD AUTO: 353 THOUSAND/UL (ref 140–400)
PMV BLD REES-ECKER: 10.9 FL (ref 7.5–12.5)
POTASSIUM SERPL-SCNC: 4.3 MMOL/L (ref 3.5–5.3)
PROT SERPL-MCNC: 7.6 G/DL (ref 6.1–8.1)
PTH-INTACT SERPL-MCNC: 17 PG/ML (ref 14–64)
RBC # BLD AUTO: 4.76 MILLION/UL (ref 3.8–5.1)
SL AMB EGFR AFRICAN AMERICAN: 100 ML/MIN/1.73M2
SL AMB EGFR NON AFRICAN AMERICAN: 86 ML/MIN/1.73M2
SODIUM SERPL-SCNC: 138 MMOL/L (ref 135–146)
TRIGL SERPL-MCNC: 88 MG/DL
TSH SERPL-ACNC: 1.08 MIU/L (ref 0.4–4.5)
WBC # BLD AUTO: 10.3 THOUSAND/UL (ref 3.8–10.8)

## 2018-03-15 ENCOUNTER — TELEPHONE (OUTPATIENT)
Dept: INTERNAL MEDICINE CLINIC | Facility: CLINIC | Age: 58
End: 2018-03-15

## 2018-03-19 ENCOUNTER — APPOINTMENT (OUTPATIENT)
Dept: RADIOLOGY | Facility: CLINIC | Age: 58
End: 2018-03-19
Payer: COMMERCIAL

## 2018-03-19 ENCOUNTER — OFFICE VISIT (OUTPATIENT)
Dept: OBGYN CLINIC | Facility: MEDICAL CENTER | Age: 58
End: 2018-03-19

## 2018-03-19 VITALS — DIASTOLIC BLOOD PRESSURE: 94 MMHG | SYSTOLIC BLOOD PRESSURE: 173 MMHG | HEART RATE: 79 BPM

## 2018-03-19 DIAGNOSIS — S52.501D CLOSED FRACTURE OF DISTAL END OF RIGHT RADIUS WITH ROUTINE HEALING, UNSPECIFIED FRACTURE MORPHOLOGY, SUBSEQUENT ENCOUNTER: ICD-10-CM

## 2018-03-19 DIAGNOSIS — S52.501D CLOSED FRACTURE OF DISTAL END OF RIGHT RADIUS WITH ROUTINE HEALING, UNSPECIFIED FRACTURE MORPHOLOGY, SUBSEQUENT ENCOUNTER: Primary | ICD-10-CM

## 2018-03-19 PROCEDURE — 99024 POSTOP FOLLOW-UP VISIT: CPT | Performed by: FAMILY MEDICINE

## 2018-03-19 PROCEDURE — 73110 X-RAY EXAM OF WRIST: CPT

## 2018-03-19 NOTE — PATIENT INSTRUCTIONS
Start wrist brace  Start daily range-of-motion exercises outside of wrist brace  Weight bearing as tolerated  No heavy lifting  Weight resistance to be guided by physical therapy     Start PT for range of motion and gentle resistance to fingers and wrist

## 2018-03-19 NOTE — PROGRESS NOTES
1  Closed fracture of distal end of right radius with routine healing, unspecified fracture morphology, subsequent encounter       No orders of the defined types were placed in this encounter  Imaging Studies (I personally reviewed results in PACS):  X-ray right wrist 03/02/2018:  Maintenance of alignment  There is interval sclerosis of fracture line with callus formation  Past diagnostics:    X-ray right wrist 02/07/2018:  Nondisplaced transverse fracture distal radius  X-ray right wrist 02/12/2018:  Maintenance of alignment of nondisplaced transverse fracture distal radius    IMPRESSION:  Right hand dominant  Right distal radius fracture nondisplaced  Date of injury 02/07/2018  Date of immobilization:  02/07/2018 or urgent care splint  Follow-up interval:  6 weeks    Planned to have repeat x-rays next visit and transition to a brace      Return in about 3 weeks (around 4/9/2018)  Patient Instructions   Start wrist brace  Weight bearing as tolerated  Start PT for range of motion and gentle resistance to fingers and wrist     Recommend patient continue her vitamin-D and calcium supplements  Again Educated patient risk of delayed healing with continued smoking        CHIEF COMPLAINT:  Follow-up right wrist fracture    HPI:  Clifton Mortensen is a 62 y o  female  who presents for follow-up right distal radius fracture  Date of injury 02/07/2018  Last visit patient placed in short-arm cast     Today, patient states that since last visit she has been using her right arm to help with care for her disabled sister  She states she has not been lifting any heavy objects however she has been making bed than lifting her sister's pains after wrist room use  She is she has some mild soreness and points to her former extensors  She denies any pain or distal radius  She has he has occasional intermittent numbness in her thumb but none presently  Review of Systems   Constitutional: Negative for fever  Neurological: Negative for weakness  Following history reviewed and update:    Past Medical History:   Diagnosis Date    Depression 7/17/2012    History of endometriosis     Osteoporosis     Polycystic ovarian syndrome      Past Surgical History:   Procedure Laterality Date    SHOULDER SURGERY      TOTAL ABDOMINAL HYSTERECTOMY W/ BILATERAL SALPINGOOPHORECTOMY      TUBAL LIGATION       Social History   History   Alcohol Use    Yes     Comment: social     History   Drug Use No     History   Smoking Status    Current Every Day Smoker   Smokeless Tobacco    Never Used     Comment: tobacco use     Family History   Problem Relation Age of Onset    Peripheral vascular disease Mother     Other Mother      peripheral arterial disease    Breast cancer Sister     Breast cancer Maternal Aunt     Hashimoto's thyroiditis Sister      No Known Allergies       Physical Exam   Constitutional: She is oriented to person, place, and time  She appears well-developed and well-nourished  HENT:   Head: Normocephalic and atraumatic  Right Ear: External ear normal    Left Ear: External ear normal    Nose: Nose normal    Eyes: Conjunctivae are normal  Right eye exhibits no discharge  Left eye exhibits no discharge  No scleral icterus  Neck: Neck supple  Pulmonary/Chest: Effort normal  No respiratory distress  Neurological: She is alert and oriented to person, place, and time  Psychiatric: She has a normal mood and affect  Her behavior is normal  Judgment and thought content normal        Ortho Exam    Right Elbow:  no swelling, erythema, or increased warmth  rom full  nontender  no laxity of joint    Right Wrist  no erythema, or increased warmth  No swelling  Nontender wrist  Range-of-motion 45 extension    Twenty flexion      Right Hand  no erythema  swelling:  None  tenderness:  None  rom fingers mcp, pip, dip intact without pain  No digital scissoring or deviation of fingers  no extensor lag  no rotation of fingers  no joint laxity  strenght flexion and extension mcp, pip, dip 5/5  sensation intact    No thumb numbness today  capillary refill intact   Procedures

## 2018-03-27 DIAGNOSIS — F32.A DEPRESSION, UNSPECIFIED DEPRESSION TYPE: Primary | ICD-10-CM

## 2018-03-27 RX ORDER — SERTRALINE HYDROCHLORIDE 100 MG/1
150 TABLET, FILM COATED ORAL DAILY
Qty: 45 TABLET | Refills: 5 | Status: SHIPPED | OUTPATIENT
Start: 2018-03-27 | End: 2018-05-10 | Stop reason: SDUPTHER

## 2018-03-27 RX ORDER — METHOCARBAMOL 500 MG/1
1000 TABLET, FILM COATED ORAL 4 TIMES DAILY PRN
Qty: 120 TABLET | Refills: 1 | Status: SHIPPED | OUTPATIENT
Start: 2018-03-27 | End: 2018-04-17 | Stop reason: SDUPTHER

## 2018-04-17 DIAGNOSIS — F32.A DEPRESSION, UNSPECIFIED DEPRESSION TYPE: ICD-10-CM

## 2018-04-17 DIAGNOSIS — R52 PAIN: ICD-10-CM

## 2018-04-17 RX ORDER — IBUPROFEN 800 MG/1
800 TABLET ORAL
Qty: 90 TABLET | Refills: 3 | Status: SHIPPED | OUTPATIENT
Start: 2018-04-17 | End: 2018-10-04 | Stop reason: SDUPTHER

## 2018-04-17 RX ORDER — METHOCARBAMOL 500 MG/1
1000 TABLET, FILM COATED ORAL 4 TIMES DAILY PRN
Qty: 120 TABLET | Refills: 0 | Status: SHIPPED | OUTPATIENT
Start: 2018-04-17 | End: 2018-05-16 | Stop reason: SDUPTHER

## 2018-05-02 DIAGNOSIS — R52 PAIN: ICD-10-CM

## 2018-05-02 DIAGNOSIS — F32.A DEPRESSION, UNSPECIFIED DEPRESSION TYPE: ICD-10-CM

## 2018-05-08 RX ORDER — SERTRALINE HYDROCHLORIDE 100 MG/1
TABLET, FILM COATED ORAL
Qty: 135 TABLET | Refills: 0 | Status: CANCELLED | OUTPATIENT
Start: 2018-05-08

## 2018-05-08 RX ORDER — METHOCARBAMOL 500 MG/1
1000 TABLET, FILM COATED ORAL 4 TIMES DAILY PRN
Qty: 120 TABLET | Refills: 0 | Status: CANCELLED | OUTPATIENT
Start: 2018-05-08

## 2018-05-10 DIAGNOSIS — F32.A DEPRESSION, UNSPECIFIED DEPRESSION TYPE: ICD-10-CM

## 2018-05-10 RX ORDER — SERTRALINE HYDROCHLORIDE 100 MG/1
150 TABLET, FILM COATED ORAL DAILY
Qty: 45 TABLET | Refills: 5 | Status: SHIPPED | OUTPATIENT
Start: 2018-05-10 | End: 2018-10-04 | Stop reason: SDUPTHER

## 2018-05-16 DIAGNOSIS — F32.A DEPRESSION, UNSPECIFIED DEPRESSION TYPE: ICD-10-CM

## 2018-05-17 DIAGNOSIS — F32.A DEPRESSION, UNSPECIFIED DEPRESSION TYPE: ICD-10-CM

## 2018-05-17 RX ORDER — METHOCARBAMOL 500 MG/1
1000 TABLET, FILM COATED ORAL 4 TIMES DAILY PRN
Qty: 120 TABLET | Refills: 0 | Status: SHIPPED | OUTPATIENT
Start: 2018-05-17 | End: 2018-06-09 | Stop reason: SDUPTHER

## 2018-05-17 RX ORDER — METHOCARBAMOL 500 MG/1
1000 TABLET, FILM COATED ORAL 4 TIMES DAILY PRN
Qty: 120 TABLET | Refills: 0 | Status: CANCELLED | OUTPATIENT
Start: 2018-05-17

## 2018-05-21 RX ORDER — IBUPROFEN 800 MG/1
TABLET ORAL
Qty: 90 TABLET | Refills: 0 | OUTPATIENT
Start: 2018-05-21

## 2018-06-09 DIAGNOSIS — F32.A DEPRESSION, UNSPECIFIED DEPRESSION TYPE: ICD-10-CM

## 2018-06-11 RX ORDER — METHOCARBAMOL 500 MG/1
TABLET, FILM COATED ORAL
Qty: 120 TABLET | Refills: 0 | Status: SHIPPED | OUTPATIENT
Start: 2018-06-11 | End: 2018-07-11 | Stop reason: SDUPTHER

## 2018-06-27 ENCOUNTER — OFFICE VISIT (OUTPATIENT)
Dept: OBGYN CLINIC | Facility: MEDICAL CENTER | Age: 58
End: 2018-06-27
Payer: COMMERCIAL

## 2018-06-27 VITALS
BODY MASS INDEX: 21.16 KG/M2 | HEIGHT: 62 IN | WEIGHT: 115 LBS | SYSTOLIC BLOOD PRESSURE: 150 MMHG | DIASTOLIC BLOOD PRESSURE: 70 MMHG | HEART RATE: 79 BPM

## 2018-06-27 DIAGNOSIS — S80.212A ABRASION, KNEE, LEFT, INITIAL ENCOUNTER: ICD-10-CM

## 2018-06-27 DIAGNOSIS — S82.045A CLOSED NONDISPLACED COMMINUTED FRACTURE OF LEFT PATELLA, INITIAL ENCOUNTER: Primary | ICD-10-CM

## 2018-06-27 DIAGNOSIS — M25.462 EFFUSION OF LEFT KNEE: ICD-10-CM

## 2018-06-27 PROCEDURE — 99214 OFFICE O/P EST MOD 30 MIN: CPT | Performed by: FAMILY MEDICINE

## 2018-06-27 RX ORDER — OXYCODONE HYDROCHLORIDE AND ACETAMINOPHEN 5; 325 MG/1; MG/1
1-2 TABLET ORAL EVERY 6 HOURS
COMMUNITY
Start: 2018-06-25 | End: 2018-06-27

## 2018-06-27 RX ORDER — OXYCODONE HYDROCHLORIDE AND ACETAMINOPHEN 5; 325 MG/1; MG/1
1 TABLET ORAL EVERY 4 HOURS PRN
Qty: 20 TABLET | Refills: 0 | Status: SHIPPED | OUTPATIENT
Start: 2018-06-27

## 2018-06-27 NOTE — PATIENT INSTRUCTIONS
Start T ROM knee brace locked in full extension  Explained the patient that she has a fracture knee cap and that any bending could result in movement of fracture fragments and need for surgery  Patient has effusion left knee and I will evaluate with MRI for any other concomitant injury  I explained to patient risk of non-operative treatment for fracture including but not limited to slippage or movement of fracture and healing of fracture in wrong location that could result in need for surgery or development of arthritis and limited range of motion after healing is resolved  Patient expressed understanding and agreed with plan to pursue non-operative treatment for fracture  Educated risks of mixing NSAIDS (also known as non-steroidal anti-inflammatory pills) including advil, ibuprofen, motrin, aleve, naproxen, aspirin, and ibuprofen containing products with each other or with steroids (such as prednisone, medrol)  Explained risks of mixing these medications including stomach ulcer, severe internal bleeding, and kidney failure  Instructed not to take NSAIDS if have history of stomach ulcers, kidney issues, or hypertension  Instructed patient to use only one brand as prescribed  For naproxen, a maximum of 500 mg per dose every 12 hours and no more than two doses or 1,000mg per day  For Ibuprofen, a maximum of 800 mg per dose every 6 hours but no more than 3 doses or 2,400 mg per day  Never take these medications together  Never take these medications the same day  For severe pain and only if you have no liver problems, you may add Tylenol (also known as acetaminophen) maximum of 1,000  Mg per dose every 6 hours but no more 3 doses or 3,000 mg per day  You may take percocet (which has tylenol/acetaminophen) in it as needed sparingly for pain  Patient expressed understanding and agreed to plan

## 2018-06-27 NOTE — PROGRESS NOTES
1  Closed nondisplaced comminuted fracture of left patella, initial encounter  MRI knee left  wo contrast   2  Abrasion, knee, left, initial encounter     3  Effusion of left knee  MRI knee left  wo contrast     Orders Placed This Encounter   Procedures    MRI knee left  wo contrast        Imaging Studies (I personally reviewed results in PACS):  X-ray 06/25/2018 left knee [facility]:  Comminuted nondisplaced transverse fracture patella  Lidia Carver  IMPRESSION:  Left knee comminuted nondisplaced transverse patellar frac ture  History of osteoporosis seen by Endocrinology with multiple fractures in last few years  Date of Injury:  06/24/2018  Follow-up interval:  3 days        Return for Follow-up after MRI  Patient Instructions   Start T ROM knee brace locked in full extension  Explained the patient that she has a fracture knee cap and that any bending could result in movement of fracture fragments and need for surgery  Patient has effusion left knee and I will evaluate with MRI for any other concomitant injury  I explained to patient risk of non-operative treatment for fracture including but not limited to slippage or movement of fracture and healing of fracture in wrong location that could result in need for surgery or development of arthritis and limited range of motion after healing is resolved  Patient expressed understanding and agreed with plan to pursue non-operative treatment for fracture  CHIEF COMPLAINT:  Left knee cap fracture    HPI:  Castro Green is a 62 y o  female  who presents for       Visit  06/27/2018:   Evaluation left knee cap injury in fracture that occurred 06/24/2018 slipping and falling with direct impact on concrete to her left knee cap  Patient was initially evaluated at [address] [place] the next day where she was diagnosed with fracture of her patella    She presents reports showing that she has a comminuted nondisplaced fracture of her patella  She was placed  In straight leg knee brace  Today, she states that her pain is  Improved since her initial injury  Associated symptoms do include spasms of her thigh as well as her calf muscle since her injury  She has been limiting her ambulation however she has been walking on it in her straight leg knee brace  Review of Systems   Constitutional: Negative for chills and fever  Neurological: Negative for weakness and numbness  Following history reviewed and update:    Past Medical History:   Diagnosis Date    Depression 7/17/2012    History of endometriosis     Osteoporosis     Polycystic ovarian syndrome      Past Surgical History:   Procedure Laterality Date    SHOULDER SURGERY      TOTAL ABDOMINAL HYSTERECTOMY W/ BILATERAL SALPINGOOPHORECTOMY      TUBAL LIGATION       Social History   History   Alcohol Use    Yes     Comment: social     History   Drug Use No     History   Smoking Status    Current Every Day Smoker   Smokeless Tobacco    Never Used     Comment: tobacco use     Family History   Problem Relation Age of Onset    Peripheral vascular disease Mother     Other Mother         peripheral arterial disease    Breast cancer Sister     Breast cancer Maternal Aunt     Hashimoto's thyroiditis Sister      No Known Allergies       Physical Exam  Constitutional:  see vital signs  Gen: well-developed, normocephalic/atraumatic, well-groomed  Eyes: No inflammation or discharge of conjunctiva or lids; sclera clear   Pharynx: no inflammation, lesion, or mass of lips  Neck: supple, no masses, non-distended  MSK: no inflammation, lesion, mass, or clubbing of nails and digits except for other than mentioned below  SKIN: no visible rashes or skin lesions  Pulmonary/Chest: Effort normal  No respiratory distress     NEURO: cranial nerves grossly intact  PSYCH:  Alert and oriented to person, place, and time; recent and remote memory intact; mood normal, no depression, anxiety, or agitation, judgment and insight good and intact     Ortho Exam    LEFT KNEE:  Erythema: no  Swelling: + 2 effusion  Increased Warmth: no  Tenderness: + patella  Flexion:   Extension: intact  Patellar Displacement:  Patellar Tilt:  Patellar Apprehension:   Patellar Grind Judge's:   Lachman's: negative  Drawer:   Varus laxity: negative  Valgus laxity: negative  Jorge:   Thessaly Test:  Dial Test:     Procedures

## 2018-07-06 DIAGNOSIS — F32.A DEPRESSION, UNSPECIFIED DEPRESSION TYPE: ICD-10-CM

## 2018-07-08 ENCOUNTER — HOSPITAL ENCOUNTER (OUTPATIENT)
Dept: MRI IMAGING | Facility: HOSPITAL | Age: 58
Discharge: HOME | End: 2018-07-08
Attending: FAMILY MEDICINE
Payer: COMMERCIAL

## 2018-07-08 DIAGNOSIS — S82.045A CLOSED NONDISPLACED COMMINUTED FRACTURE OF LEFT PATELLA, INITIAL ENCOUNTER: ICD-10-CM

## 2018-07-08 DIAGNOSIS — M25.462 EFFUSION OF LEFT KNEE: ICD-10-CM

## 2018-07-08 PROCEDURE — 73721 MRI JNT OF LWR EXTRE W/O DYE: CPT

## 2018-07-12 ENCOUNTER — OFFICE VISIT (OUTPATIENT)
Dept: OBGYN CLINIC | Facility: MEDICAL CENTER | Age: 58
End: 2018-07-12
Payer: COMMERCIAL

## 2018-07-12 VITALS
HEIGHT: 62 IN | WEIGHT: 115 LBS | HEART RATE: 84 BPM | BODY MASS INDEX: 21.16 KG/M2 | DIASTOLIC BLOOD PRESSURE: 67 MMHG | SYSTOLIC BLOOD PRESSURE: 117 MMHG

## 2018-07-12 DIAGNOSIS — S82.035D NONDISPLACED TRANSVERSE FRACTURE OF LEFT PATELLA, SUBSEQUENT ENCOUNTER FOR CLOSED FRACTURE WITH ROUTINE HEALING: Primary | ICD-10-CM

## 2018-07-12 PROCEDURE — 27520 TREAT KNEECAP FRACTURE: CPT | Performed by: FAMILY MEDICINE

## 2018-07-12 PROCEDURE — 99213 OFFICE O/P EST LOW 20 MIN: CPT | Performed by: FAMILY MEDICINE

## 2018-07-12 NOTE — PATIENT INSTRUCTIONS
Continue T ROM brace locked in full extension  Did explain the patient risk of fracture fragment movement if removed brace  I also explained the patient still has risk of fracture fragment movement even if it is compliant with brace  I offered patient cane today for her with ambulation but she declined  Explained risk of fall when ambulating with T ROM brace and risk of recurrent or new injury  Patient expressed understanding and still declined cane    I explained to patient risk of non-operative treatment for fracture including but not limited to slippage or movement of fracture and healing of fracture in wrong location that could result in need for surgery or development of arthritis and limited range of motion after healing is resolved  Patient expressed understanding and agreed with plan to pursue non-operative treatment for fracture

## 2018-07-12 NOTE — PROGRESS NOTES
1  Nondisplaced transverse fracture of left patella, subsequent encounter for closed fracture with routine healing       No orders of the defined types were placed in this encounter  Imaging Studies (I personally reviewed results in PACS):  MRI left knee 07/08/2018:  No acute internal derangement other than nondisplaced transverse patellar fracture      Past diagnostics:    X-ray 06/25/2018 left knee [facility]:  Comminuted nondisplaced transverse fracture patella  Montrell Britt  IMPRESSION:  Left knee comminuted nondisplaced transverse patellar frac ture  History of osteoporosis seen by Endocrinology with multiple fractures in last few years  Date of Injury:  06/24/2018  Follow-up interval:  2 weeks 4 days      Return in about 2 weeks (around 7/26/2018)  With repeat x-ray      Patient Instructions   Continue T ROM brace locked in full extension  Did explain the patient risk of fracture fragment movement if removed brace  I also explained the patient still has risk of fracture fragment movement even if it is compliant with brace  I offered patient cane today for her with ambulation but she declined  Explained risk of fall when ambulating with T ROM brace and risk of recurrent or new injury  Patient expressed understanding and still declined cane    I explained to patient risk of non-operative treatment for fracture including but not limited to slippage or movement of fracture and healing of fracture in wrong location that could result in need for surgery or development of arthritis and limited range of motion after healing is resolved  Patient expressed understanding and agreed with plan to pursue non-operative treatment for fracture               CHIEF COMPLAINT:  Left knee cap fracture    HPI:  Charlotte Donovan is a 62 y o  female  who presents for       Visit  06/27/2018:   Evaluation left knee cap injury in fracture that occurred 06/24/2018 slipping and falling with direct impact on concrete to her left knee cap  Patient was initially evaluated at [address] the next day where she was diagnosed with fracture of her patella  She presents reports showing that she has a comminuted nondisplaced fracture of her patella  She was placed  In straight leg knee brace  Today, she states that her pain is  Improved since her initial injury  Associated symptoms do include spasms of her thigh as well as her calf muscle since her injury  She has been limiting her ambulation however she has been walking on it in her straight leg knee brace  Visit 07/12/2018: Follow-up left knee patellar fracture transverse nondisplaced:  Stable  Improving pain  Patient denies any pain when walking  She does have intermittent spasm in her thigh on occasion mild to moderate intensity that resolved spontaneously on his own  She is having no difficulty walking in her straight leg T ROM brace locked in extension full  She did have MRI ordered last visit which does show that she has no other internal derangement the knee and has an intact quadriceps tendon  Review of Systems   Constitutional: Negative for chills and fever  Neurological: Negative for weakness and numbness           Following history reviewed and update:    Past Medical History:   Diagnosis Date    Depression 7/17/2012    History of endometriosis     Osteoporosis     Polycystic ovarian syndrome      Past Surgical History:   Procedure Laterality Date    SHOULDER SURGERY      TOTAL ABDOMINAL HYSTERECTOMY W/ BILATERAL SALPINGOOPHORECTOMY      TUBAL LIGATION       Social History   History   Alcohol Use    Yes     Comment: social     History   Drug Use No     History   Smoking Status    Current Every Day Smoker   Smokeless Tobacco    Never Used     Comment: tobacco use     Family History   Problem Relation Age of Onset    Peripheral vascular disease Mother    Angela Peguero Other Mother         peripheral arterial disease    Breast cancer Sister     Breast cancer Maternal Aunt     Hashimoto's thyroiditis Sister      No Known Allergies       Physical Exam  Constitutional:  see vital signs  Gen: well-developed, normocephalic/atraumatic, well-groomed  Eyes: No inflammation or discharge of conjunctiva or lids; sclera clear   Pharynx: no inflammation, lesion, or mass of lips  Neck: supple, no masses, non-distended  MSK: no inflammation, lesion, mass, or clubbing of nails and digits except for other than mentioned below  SKIN: no visible rashes or skin lesions  Pulmonary/Chest: Effort normal  No respiratory distress     NEURO: cranial nerves grossly intact  PSYCH:  Alert and oriented to person, place, and time; recent and remote memory intact; mood normal, no depression, anxiety, or agitation, judgment and insight good and intact      Ortho Exam  LEFT KNEE:  Erythema: no  Swelling: no  Increased Warmth: no  Tenderness: + mild tenderness anterior patella  Flexion:   Extension: intact  Patellar Displacement:  None    Procedures

## 2018-07-13 RX ORDER — METHOCARBAMOL 500 MG/1
TABLET, FILM COATED ORAL
Qty: 120 TABLET | Refills: 0 | Status: SHIPPED | OUTPATIENT
Start: 2018-07-13 | End: 2018-08-15 | Stop reason: SDUPTHER

## 2018-07-26 ENCOUNTER — APPOINTMENT (OUTPATIENT)
Dept: RADIOLOGY | Facility: CLINIC | Age: 58
End: 2018-07-26
Payer: COMMERCIAL

## 2018-07-26 ENCOUNTER — OFFICE VISIT (OUTPATIENT)
Dept: OBGYN CLINIC | Facility: MEDICAL CENTER | Age: 58
End: 2018-07-26

## 2018-07-26 VITALS — SYSTOLIC BLOOD PRESSURE: 168 MMHG | HEART RATE: 67 BPM | DIASTOLIC BLOOD PRESSURE: 79 MMHG

## 2018-07-26 DIAGNOSIS — S82.035D NONDISPLACED TRANSVERSE FRACTURE OF LEFT PATELLA, SUBSEQUENT ENCOUNTER FOR CLOSED FRACTURE WITH ROUTINE HEALING: Primary | ICD-10-CM

## 2018-07-26 DIAGNOSIS — S82.035D NONDISPLACED TRANSVERSE FRACTURE OF LEFT PATELLA, SUBSEQUENT ENCOUNTER FOR CLOSED FRACTURE WITH ROUTINE HEALING: ICD-10-CM

## 2018-07-26 PROCEDURE — 73564 X-RAY EXAM KNEE 4 OR MORE: CPT

## 2018-07-26 PROCEDURE — 99024 POSTOP FOLLOW-UP VISIT: CPT | Performed by: FAMILY MEDICINE

## 2018-07-26 NOTE — PROGRESS NOTES
1  Nondisplaced transverse fracture of left patella, subsequent encounter for closed fracture with routine healing  XR knee 4+ vw left injury     Orders Placed This Encounter   Procedures    XR knee 4+ vw left injury        Imaging Studies (I personally reviewed results in PACS):   X-ray left knee 07/26/2018:  Stable alignment of nondisplaced fracture patella  There is persistent visible fracture line  There is no evidence of significant healing  Past diagnostics:  MRI left knee 07/08/2018:  No acute internal derangement other than nondisplaced transverse patellar fracture  X-ray 06/25/2018 left knee [address]:  Comminuted nondisplaced transverse fracture patella  Christian Radar  IMPRESSION:  Left knee comminuted nondisplaced transverse patellar frac ture  History of osteoporosis seen by Endocrinology with multiple fractures in last few years  Date of Injury:  06/24/2018  Follow-up interval: 4 weeks    I consulted with Dr Deborah Chavez and Dr Abelino Pollard who both agreed to continue nonoperative treatment at this standpoint  Will see in 2 weeks with repeat xray and unlock to allow for 0-30 degrees  Return in about 2 weeks (around 8/9/2018)  Patient Instructions   Explained the patient that she still has persistent fracture line in her patella  Explained that she will require at least 2 more weeks of immobilization with her knee straight in her T ROM brace  I explained that any flexion of her knee at all could result in pulling apart of her knee cap and requirement of surgery  I also explained that if she fails to heal in you to 5 her kneecap and she will also require surgery  Patient expressed understanding agreed to plan to continue with non operative treatment  Will reassess in approximately 2 weeks with repeat x-rays  I recommend against taking anti-inflammatory medications also known NSAIDs    These medications include but not limited to Motrin, ibuprofen, Advil, Aleve, naproxen, meloxicam   These medications should not be used in the setting of known high blood pressure as well as kidney issues, stomach ulcers, gastrointestinal bleeding from the rectum or the stomach, or simultaneously with blood thinners  Risks of taking NSAIDs include severely elevated blood pressure that can lead to stroke and heart attack, kidney failure, and severe internal bleeding  If you have no liver problems, you may take Tylenol (also known as acetaminophen) at a  maximum of 1,000  Mg per dose every 6 hours as needed for pain but no more 3 doses or 3,000 mg per day  If you are taking other medications which include tylenol (acetaminophen) such as hydrocodone-acetaminophen then you must factor in the amount of tylenol (acetamionphen) into your 3,000mg maximum dose  Patient expressed understanding and agreed to plan  CHIEF COMPLAINT:  Left knee cap fracture    HPI:  Lyly Guillermo is a 62 y o  female  who presents for       Visit  06/27/2018:   Evaluation left knee cap injury in fracture that occurred 06/24/2018 slipping and falling with direct impact on concrete to her left knee cap  Patient was initially evaluated at [address] the next day where she was diagnosed with fracture of her patella  She presents reports showing that she has a comminuted nondisplaced fracture of her patella  She was placed  In straight leg knee brace  Today, she states that her pain is  Improved since her initial injury  Associated symptoms do include spasms of her thigh as well as her calf muscle since her injury  She has been limiting her ambulation however she has been walking on it in her straight leg knee brace  Visit 07/12/2018: Follow-up left knee patellar fracture transverse nondisplaced:  Stable  Improving pain  Patient denies any pain when walking    She does have intermittent spasm in her thigh on occasion mild to moderate intensity that resolved spontaneously on his own  She is having no difficulty walking in her straight leg T ROM brace locked in extension full  She did have MRI ordered last visit which does show that she has no other internal derangement the knee and has an intact quadriceps tendon  Visit 07/26/2018: Follow-up left knee patellar fracture transverse nondisplaced:  4 weeks from date of injury  Patient placed in T ROM brace locked in full extension  Patient compliant with her knee brace  She has not removed  She is kidney in full extension  She states she has intermittent throbbing pain on occasion when walking for long periods time  For the most part she states that she has no pain  Swelling is reduced since last visit  Review of Systems   Constitutional: Negative for chills and fever  Neurological: Negative for weakness and numbness           Following history reviewed and update:    Past Medical History:   Diagnosis Date    Depression 7/17/2012    History of endometriosis     Osteoporosis     Polycystic ovarian syndrome      Past Surgical History:   Procedure Laterality Date    SHOULDER SURGERY      TOTAL ABDOMINAL HYSTERECTOMY W/ BILATERAL SALPINGOOPHORECTOMY      TUBAL LIGATION       Social History   History   Alcohol Use    Yes     Comment: social     History   Drug Use No     History   Smoking Status    Current Every Day Smoker   Smokeless Tobacco    Never Used     Comment: tobacco use     Family History   Problem Relation Age of Onset    Peripheral vascular disease Mother     Other Mother         peripheral arterial disease    Breast cancer Sister     Breast cancer Maternal Aunt     Hashimoto's thyroiditis Sister      No Known Allergies       Physical Exam  Constitutional:  see vital signs  Gen: well-developed, normocephalic/atraumatic, well-groomed  Eyes: No inflammation or discharge of conjunctiva or lids; sclera clear   Pharynx: no inflammation, lesion, or mass of lips  Neck: supple, no masses, non-distended  MSK: no inflammation, lesion, mass, or clubbing of nails and digits except for other than mentioned below  SKIN: no visible rashes or skin lesions  Pulmonary/Chest: Effort normal  No respiratory distress     NEURO: cranial nerves grossly intact  PSYCH:  Alert and oriented to person, place, and time; recent and remote memory intact; mood normal, no depression, anxiety, or agitation, judgment and insight good and intact      Ortho Exam    LEFT KNEE:  Erythema: no  Swelling:  Mild improvement last visit  Increased Warmth: no  Tenderness:  Mild anterior patella  Range-of-motion:  Patient remains in T ROM brace locked in full extension    Left Calf exam:  No swelling erythema or increased warmth        Procedures

## 2018-07-26 NOTE — PATIENT INSTRUCTIONS
Explained the patient that she still has persistent fracture line in her patella  Explained that she will require at least 2 more weeks of immobilization with her knee straight in her T ROM brace  I explained that any flexion of her knee at all could result in pulling apart of her knee cap and requirement of surgery  I also explained that if she fails to heal in you to 5 her kneecap and she will also require surgery  Patient expressed understanding agreed to plan to continue with non operative treatment  Will reassess in approximately 2 weeks with repeat x-rays  I recommend against taking anti-inflammatory medications also known NSAIDs  These medications include but not limited to Motrin, ibuprofen, Advil, Aleve, naproxen, meloxicam   These medications should not be used in the setting of known high blood pressure as well as kidney issues, stomach ulcers, gastrointestinal bleeding from the rectum or the stomach, or simultaneously with blood thinners  Risks of taking NSAIDs include severely elevated blood pressure that can lead to stroke and heart attack, kidney failure, and severe internal bleeding  If you have no liver problems, you may take Tylenol (also known as acetaminophen) at a  maximum of 1,000  Mg per dose every 6 hours as needed for pain but no more 3 doses or 3,000 mg per day  If you are taking other medications which include tylenol (acetaminophen) such as hydrocodone-acetaminophen then you must factor in the amount of tylenol (acetamionphen) into your 3,000mg maximum dose  Patient expressed understanding and agreed to plan

## 2018-08-09 ENCOUNTER — OFFICE VISIT (OUTPATIENT)
Dept: OBGYN CLINIC | Facility: MEDICAL CENTER | Age: 58
End: 2018-08-09

## 2018-08-09 ENCOUNTER — APPOINTMENT (OUTPATIENT)
Dept: RADIOLOGY | Facility: CLINIC | Age: 58
End: 2018-08-09
Payer: COMMERCIAL

## 2018-08-09 VITALS
BODY MASS INDEX: 22.31 KG/M2 | DIASTOLIC BLOOD PRESSURE: 78 MMHG | SYSTOLIC BLOOD PRESSURE: 130 MMHG | HEIGHT: 62 IN | HEART RATE: 80 BPM | WEIGHT: 121.2 LBS

## 2018-08-09 DIAGNOSIS — S82.035D NONDISPLACED TRANSVERSE FRACTURE OF LEFT PATELLA, SUBSEQUENT ENCOUNTER FOR CLOSED FRACTURE WITH ROUTINE HEALING: ICD-10-CM

## 2018-08-09 DIAGNOSIS — S82.035D NONDISPLACED TRANSVERSE FRACTURE OF LEFT PATELLA, SUBSEQUENT ENCOUNTER FOR CLOSED FRACTURE WITH ROUTINE HEALING: Primary | ICD-10-CM

## 2018-08-09 PROCEDURE — 73564 X-RAY EXAM KNEE 4 OR MORE: CPT

## 2018-08-09 PROCEDURE — 99024 POSTOP FOLLOW-UP VISIT: CPT | Performed by: FAMILY MEDICINE

## 2018-08-09 NOTE — PATIENT INSTRUCTIONS
Explained the patient that there is some mild interval healing since her last x-ray  At this time we will unlock patient's T ROM brace to allow for ease 0-30 degrees of flexion to prevent stiffness  I did explained the patient that there is still a chance that fracture fragment may move at her next x-ray in approximately 2 weeks and that there still is a risk of requirement for surgery  I explained that she will also have stiffness in her knee that will require extensive physical therapy after completion of her T ROM brace to regain full flexion  Patient expressed understanding agreed to plan

## 2018-08-09 NOTE — PROGRESS NOTES
1  Nondisplaced transverse fracture of left patella, subsequent encounter for closed fracture with routine healing  XR knee 4+ vw left injury     Orders Placed This Encounter   Procedures    XR knee 4+ vw left injury        Imaging Studies (I personally reviewed results in PACS):  X-ray left knee 08/09/2018: Stable alignment of nondisplaced patella fracture  There is mild interval healing but remains visible fracture line and mild distraction  Past diagnostics:  MRI left knee 07/08/2018:  No acute internal derangement other than nondisplaced transverse patellar fracture  X-ray 06/25/2018 left knee [facility]:  Comminuted nondisplaced transverse fracture patella  Fatoumata Ann  IMPRESSION:  Left knee comminuted nondisplaced transverse patellar frac ture  History of osteoporosis seen by Endocrinology with multiple fractures in last few years  Date of Injury:  06/24/2018  Follow-up interval: 6 weeks 4 days      Return in about 2 weeks (around 8/23/2018)  Patient Instructions   Explained the patient that there is some mild interval healing since her last x-ray  At this time we will unlock patient's T ROM brace to allow for ease 0-30 degrees of flexion to prevent stiffness  I did explained the patient that there is still a chance that fracture fragment may move at her next x-ray in approximately 2 weeks and that there still is a risk of requirement for surgery  I explained that she will also have stiffness in her knee that will require extensive physical therapy after completion of her T ROM brace to regain full flexion  Patient expressed understanding agreed to plan  CHIEF COMPLAINT:    Follow-up left patellar fracture    HPI:  Len Patel is a 62 y o  female  who presents for       Visit  06/27/2018:   Evaluation left knee cap injury in fracture that occurred 06/24/2018 slipping and falling with direct impact on concrete to her left knee cap    Patient was initially evaluated at [address] the next day where she was diagnosed with fracture of her patella  She presents reports showing that she has a comminuted nondisplaced fracture of her patella  She was placed  In straight leg knee brace  Today, she states that her pain is  Improved since her initial injury  Associated symptoms do include spasms of her thigh as well as her calf muscle since her injury  She has been limiting her ambulation however she has been walking on it in her straight leg knee brace  Visit 07/12/2018: Follow-up left knee patellar fracture transverse nondisplaced:  Stable  Improving pain  Patient denies any pain when walking  She does have intermittent spasm in her thigh on occasion mild to moderate intensity that resolved spontaneously on his own  She is having no difficulty walking in her straight leg T ROM brace locked in extension full  She did have MRI ordered last visit which does show that she has no other internal derangement the knee and has an intact quadriceps tendon  Visit 07/26/2018: Follow-up left knee patellar fracture transverse nondisplaced:  4 weeks from date of injury  Patient placed in T ROM brace locked in full extension  Patient compliant with her knee brace  She has not removed  She is kidney in full extension  She states she has intermittent throbbing pain on occasion when walking for long periods time  For the most part she states that she has no pain  Swelling is reduced since last visit  visit 08/09/2018: Follow-up left knee patellar fracture:  Stable  Compliant with T ROM brace locked in full extension  Six weeks and 4 days today from initial injury date  Patient does state she has some rubbing of her brace medial femoral condyle when pointing examination room as well as lateral ankle  Denies any redness discharge swelling    Denies any pain at rest    Walking without  Significant difficulty   In T ROM brace       Review of Systems   Constitutional: Negative for chills and fever  Neurological: Negative for weakness and numbness  Following history reviewed and update:    Past Medical History:   Diagnosis Date    Depression 7/17/2012    History of endometriosis     Osteoporosis     Polycystic ovarian syndrome      Past Surgical History:   Procedure Laterality Date    SHOULDER SURGERY      TOTAL ABDOMINAL HYSTERECTOMY W/ BILATERAL SALPINGOOPHORECTOMY      TUBAL LIGATION       Social History   History   Alcohol Use    Yes     Comment: social     History   Drug Use No     History   Smoking Status    Current Every Day Smoker   Smokeless Tobacco    Never Used     Comment: tobacco use     Family History   Problem Relation Age of Onset    Peripheral vascular disease Mother     Other Mother         peripheral arterial disease    Breast cancer Sister     Breast cancer Maternal Aunt     Hashimoto's thyroiditis Sister      No Known Allergies       Physical Exam  Constitutional:  see vital signs  Gen: well-developed, normocephalic/atraumatic, well-groomed  Eyes: No inflammation or discharge of conjunctiva or lids; sclera clear   Pharynx: no inflammation, lesion, or mass of lips  Neck: supple, no masses, non-distended  MSK: no inflammation, lesion, mass, or clubbing of nails and digits except for other than mentioned below  SKIN: no visible rashes or skin lesions  Pulmonary/Chest: Effort normal  No respiratory distress     NEURO: cranial nerves grossly intact  PSYCH:  Alert and oriented to person, place, and time; recent and remote memory intact; mood normal, no depression, anxiety, or agitation, judgment and insight good and intact    Ortho Exam    LEFT KNEE:  Erythema: no  Swelling: no  Increased Warmth: no  Tenderness:  Mild lateral inferior pole patella  Flexion:   Flexes to 30 in T ROM brace with relief of tension and no pain  Extension: intact  Lachman's: negative          Procedures

## 2018-08-15 DIAGNOSIS — F32.A DEPRESSION, UNSPECIFIED DEPRESSION TYPE: ICD-10-CM

## 2018-08-15 RX ORDER — METHOCARBAMOL 500 MG/1
TABLET, FILM COATED ORAL
Qty: 120 TABLET | Refills: 0 | Status: SHIPPED | OUTPATIENT
Start: 2018-08-15 | End: 2018-09-14 | Stop reason: SDUPTHER

## 2018-08-23 ENCOUNTER — OFFICE VISIT (OUTPATIENT)
Dept: OBGYN CLINIC | Facility: MEDICAL CENTER | Age: 58
End: 2018-08-23

## 2018-08-23 ENCOUNTER — APPOINTMENT (OUTPATIENT)
Dept: RADIOLOGY | Facility: CLINIC | Age: 58
End: 2018-08-23
Payer: COMMERCIAL

## 2018-08-23 VITALS
SYSTOLIC BLOOD PRESSURE: 148 MMHG | WEIGHT: 117 LBS | BODY MASS INDEX: 21.53 KG/M2 | HEIGHT: 62 IN | HEART RATE: 80 BPM | DIASTOLIC BLOOD PRESSURE: 70 MMHG

## 2018-08-23 DIAGNOSIS — S82.035D NONDISPLACED TRANSVERSE FRACTURE OF LEFT PATELLA, SUBSEQUENT ENCOUNTER FOR CLOSED FRACTURE WITH ROUTINE HEALING: ICD-10-CM

## 2018-08-23 DIAGNOSIS — S82.035D NONDISPLACED TRANSVERSE FRACTURE OF LEFT PATELLA, SUBSEQUENT ENCOUNTER FOR CLOSED FRACTURE WITH ROUTINE HEALING: Primary | ICD-10-CM

## 2018-08-23 PROCEDURE — 73564 X-RAY EXAM KNEE 4 OR MORE: CPT

## 2018-08-23 PROCEDURE — 99024 POSTOP FOLLOW-UP VISIT: CPT | Performed by: FAMILY MEDICINE

## 2018-08-23 NOTE — PATIENT INSTRUCTIONS
Explained the patient that there is no movement of her fracture fragment on her x-ray however there is minimal healing callus  As such we will continue with her T ROM brace locked at 30° of flexion and evaluate for healing with CT scan

## 2018-08-23 NOTE — PROGRESS NOTES
1  Nondisplaced transverse fracture of left patella, subsequent encounter for closed fracture with routine healing  XR knee 4+ vw left injury    CT knee left wo contrast     Orders Placed This Encounter   Procedures    XR knee 4+ vw left injury    CT knee left wo contrast        Imaging Studies (I personally reviewed results in PACS):  X-ray left knee 08/23/2018:  Stable comminuted nondisplaced transverse fracture patella with minimal interval healing  Past diagnostics:  MRI left knee 07/08/2018:  No acute internal derangement other than nondisplaced transverse patellar fracture  X-ray 06/25/2018 left knee [address]:  Comminuted nondisplaced transverse fracture patella  Lou Campbell  IMPRESSION:  Left knee comminuted nondisplaced transverse patellar frac tenzin  History of osteoporosis seen by Endocrinology with multiple fractures in last few years  Date of Injury:  06/24/2018  Follow-up interval: 8 weeks 4 days      Return for Follow-up after CT scan  Patient Instructions   Explained the patient that there is no movement of her fracture fragment on her x-ray however there is minimal healing callus  As such we will continue with her T ROM brace locked at 30° of flexion and evaluate for healing with CT scan  CHIEF COMPLAINT:    Follow-up left patellar fracture    HPI:  Cristhian Fiore is a 62 y o  female  who presents for       Visit  06/27/2018:   Evaluation left knee cap injury in fracture that occurred 06/24/2018 slipping and falling with direct impact on concrete to her left knee cap  Patient was initially evaluated at [address] the next day where she was diagnosed with fracture of her patella  She presents reports showing that she has a comminuted nondisplaced fracture of her patella  She was placed  In straight leg knee brace  Today, she states that her pain is  Improved since her initial injury    Associated symptoms do include spasms of her thigh as well as her calf muscle since her injury  She has been limiting her ambulation however she has been walking on it in her straight leg knee brace  Visit 07/12/2018: Follow-up left knee patellar fracture transverse nondisplaced:  Stable  Improving pain  Patient denies any pain when walking  She does have intermittent spasm in her thigh on occasion mild to moderate intensity that resolved spontaneously on his own  She is having no difficulty walking in her straight leg T ROM brace locked in extension full  She did have MRI ordered last visit which does show that she has no other internal derangement the knee and has an intact quadriceps tendon  Visit 07/26/2018: Follow-up left knee patellar fracture transverse nondisplaced:  4 weeks from date of injury  Patient placed in T ROM brace locked in full extension  Patient compliant with her knee brace  She has not removed  She is kidney in full extension  She states she has intermittent throbbing pain on occasion when walking for long periods time  For the most part she states that she has no pain  Swelling is reduced since last visit  visit 08/09/2018: Follow-up left knee patellar fracture:  Stable  Compliant with T ROM brace locked in full extension  Six weeks and 4 days today from initial injury date  Patient does state she has some rubbing of her brace medial femoral condyle when pointing examination room as well as lateral ankle  Denies any redness discharge swelling  Denies any pain at rest    Walking without  Significant difficulty   In T ROM brace  Visit 08/23/2018: Follow-up left knee patellar fracture:  Stable  Patient states she has some mild soreness anterior knee but overall no pain  She has been compliant with her T ROM brace locked at 30° since her last visit  Review of Systems   Constitutional: Negative for chills and fever  Neurological: Negative for weakness and numbness           Following history reviewed and update:    Past Medical History:   Diagnosis Date    Depression 7/17/2012    History of endometriosis     Osteoporosis     Polycystic ovarian syndrome      Past Surgical History:   Procedure Laterality Date    SHOULDER SURGERY      TOTAL ABDOMINAL HYSTERECTOMY W/ BILATERAL SALPINGOOPHORECTOMY      TUBAL LIGATION       Social History   History   Alcohol Use    Yes     Comment: social     History   Drug Use No     History   Smoking Status    Current Every Day Smoker   Smokeless Tobacco    Never Used     Comment: tobacco use     Family History   Problem Relation Age of Onset    Peripheral vascular disease Mother     Other Mother         peripheral arterial disease    Breast cancer Sister     Breast cancer Maternal Aunt     Hashimoto's thyroiditis Sister      No Known Allergies       Physical Exam  Constitutional:  see vital signs  Gen: well-developed, normocephalic/atraumatic, well-groomed  Eyes: No inflammation or discharge of conjunctiva or lids; sclera clear   Pharynx: no inflammation, lesion, or mass of lips  Neck: supple, no masses, non-distended  MSK: no inflammation, lesion, mass, or clubbing of nails and digits except for other than mentioned below  SKIN: no visible rashes or skin lesions  Pulmonary/Chest: Effort normal  No respiratory distress     NEURO: cranial nerves grossly intact  PSYCH:  Alert and oriented to person, place, and time; recent and remote memory intact; mood normal, no depression, anxiety, or agitation, judgment and insight good and intact    Ortho Exam    LEFT KNEE:  Erythema: no  Swelling: no  Increased Warmth: no  Tenderness:  Mild anterior patella laterally    Procedures

## 2018-08-27 ENCOUNTER — TELEPHONE (OUTPATIENT)
Dept: OBGYN CLINIC | Facility: MEDICAL CENTER | Age: 58
End: 2018-08-27

## 2018-08-27 NOTE — TELEPHONE ENCOUNTER
Please inform patient that I reviewed her xrays with surgeon- Dr Scooter Sargent  He agrees that there is some healing present and no need for the CT scan which she is to cancel at this time  Dr Scooter Sargent also agrees to continue TROM brace and to return for visit to unlock at 45 degrees to allow for some increased flexion to help prevent stiffness as much as possible  I recommend appointment this week to adjust brace

## 2018-08-28 NOTE — TELEPHONE ENCOUNTER
Called and left johann asking her to call office or scheduling line to make an appt for this week Thursday or Friday since we will have a DME person here

## 2018-08-29 NOTE — TELEPHONE ENCOUNTER
Patient called back and I spoke with her informing of below info  Also she has an appt for this Friday to get her TROM brace adjusted  Also told her to cancel CT per WM

## 2018-08-29 NOTE — TELEPHONE ENCOUNTER
Called patient's emergency contact on file (daughter) and spoke to her asking of she could let patient know to call office so we can see when she can come in to get her TROM brace adjusted  Patient's daughter explained that her mother has a stomach bug which is most likely why she is not answering  Gave daughter office number and asked if she relay message to mother call office preferably today so we can try and set up when to come in if not this week, early next week  She confirmed understanding

## 2018-08-31 ENCOUNTER — OFFICE VISIT (OUTPATIENT)
Dept: OBGYN CLINIC | Facility: MEDICAL CENTER | Age: 58
End: 2018-08-31

## 2018-08-31 VITALS — HEIGHT: 62 IN

## 2018-08-31 DIAGNOSIS — S82.035D NONDISPLACED TRANSVERSE FRACTURE OF LEFT PATELLA, SUBSEQUENT ENCOUNTER FOR CLOSED FRACTURE WITH ROUTINE HEALING: Primary | ICD-10-CM

## 2018-08-31 PROCEDURE — 99024 POSTOP FOLLOW-UP VISIT: CPT | Performed by: FAMILY MEDICINE

## 2018-09-03 NOTE — PROGRESS NOTES
1  Nondisplaced transverse fracture of left patella, subsequent encounter for closed fracture with routine healing       No orders of the defined types were placed in this encounter  Imaging Studies (I personally reviewed results in PACS):      Past diagnostics:  X-ray left knee 08/23/2018:  Stable comminuted nondisplaced transverse fracture patella with minimal interval healing  MRI left knee 07/08/2018:  No acute internal derangement other than nondisplaced transverse patellar fracture  X-ray 06/25/2018 left knee [address]:  Comminuted nondisplaced transverse fracture patella  Thresa Pramod  IMPRESSION:  Left knee comminuted nondisplaced transverse patellar frac ture  History of osteoporosis seen by Endocrinology with multiple fractures in last few years  Date of Injury:  06/24/2018  Follow-up interval: 9 weeks 5 days      Return in about 2 weeks (around 9/14/2018)  With repeat x-ray  No Merchant or sunrise view  No flexing knee on x-rays      Patient Instructions   Explained the patient that I did consult with surgeon Dr Gabriela Tidwell who agree with continue to pursue non operative treatment this time  Explained that I cancel her CT scan and that due to evidence of healing on her x-rays I would not think it is appropriate to expose returned radiation of her CT scan at this time  Patient expressed understanding and agreed to plan  Increased T ROM brace to 45° of flexion  CHIEF COMPLAINT:    Follow-up left patellar fracture    HPI:  Grayson Rob is a 62 y o  female  who presents for       Visit  06/27/2018:   Evaluation left knee cap injury in fracture that occurred 06/24/2018 slipping and falling with direct impact on concrete to her left knee cap  Patient was initially evaluated at [address] the next day where she was diagnosed with fracture of her patella    She presents reports showing that she has a comminuted nondisplaced fracture of her patella  She was placed  In straight leg knee brace  Today, she states that her pain is  Improved since her initial injury  Associated symptoms do include spasms of her thigh as well as her calf muscle since her injury  She has been limiting her ambulation however she has been walking on it in her straight leg knee brace  Visit 07/12/2018: Follow-up left knee patellar fracture transverse nondisplaced:  Stable  Improving pain  Patient denies any pain when walking  She does have intermittent spasm in her thigh on occasion mild to moderate intensity that resolved spontaneously on his own  She is having no difficulty walking in her straight leg T ROM brace locked in extension full  She did have MRI ordered last visit which does show that she has no other internal derangement the knee and has an intact quadriceps tendon  Visit 07/26/2018: Follow-up left knee patellar fracture transverse nondisplaced:  4 weeks from date of injury  Patient placed in T ROM brace locked in full extension  Patient compliant with her knee brace  She has not removed  She is kidney in full extension  She states she has intermittent throbbing pain on occasion when walking for long periods time  For the most part she states that she has no pain  Swelling is reduced since last visit  visit 08/09/2018: Follow-up left knee patellar fracture:  Stable  Compliant with T ROM brace locked in full extension  Six weeks and 4 days today from initial injury date  Patient does state she has some rubbing of her brace medial femoral condyle when pointing examination room as well as lateral ankle  Denies any redness discharge swelling  Denies any pain at rest    Walking without  Significant difficulty   In T ROM brace  Visit 08/23/2018: Follow-up left knee patellar fracture:  Stable  Patient states she has some mild soreness anterior knee but overall no pain    She has been compliant with her T ROM brace locked at 30° since her last visit  Visit 08/31/2018: Follow-up left knee patellar fracture:  Stable  Last visit had discussed with patient possibly pursuing CT scan to evaluate for healing  Explained the patient that I did consult with surgeon Dr Magalis Stafford who reviewed x-rays in case history and agreed that there was evidence of healing on x-rays when compared to previous and no need for CT scan at this time  Patient today denies any pain  She has been compliant with the T ROM brace which is currently at 30° flexion  She continues to smoke cigarettes  Review of Systems   Constitutional: Negative for chills and fever  Neurological: Negative for weakness and numbness           Following history reviewed and update:    Past Medical History:   Diagnosis Date    Depression 7/17/2012    History of endometriosis     Osteoporosis     Polycystic ovarian syndrome      Past Surgical History:   Procedure Laterality Date    SHOULDER SURGERY      TOTAL ABDOMINAL HYSTERECTOMY W/ BILATERAL SALPINGOOPHORECTOMY      TUBAL LIGATION       Social History   History   Alcohol Use    Yes     Comment: social     History   Drug Use No     History   Smoking Status    Current Every Day Smoker   Smokeless Tobacco    Never Used     Comment: tobacco use     Family History   Problem Relation Age of Onset    Peripheral vascular disease Mother     Other Mother         peripheral arterial disease    Breast cancer Sister     Breast cancer Maternal Aunt     Hashimoto's thyroiditis Sister      No Known Allergies       Physical Exam  Constitutional:  see vital signs  Gen: well-developed, normocephalic/atraumatic, well-groomed  Eyes: No inflammation or discharge of conjunctiva or lids; sclera clear   Pharynx: no inflammation, lesion, or mass of lips  Neck: supple, no masses, non-distended  MSK: no inflammation, lesion, mass, or clubbing of nails and digits except for other than mentioned below  SKIN: no visible rashes or skin lesions  Pulmonary/Chest: Effort normal  No respiratory distress     NEURO: cranial nerves grossly intact  PSYCH:  Alert and oriented to person, place, and time; recent and remote memory intact; mood normal, no depression, anxiety, or agitation, judgment and insight good and intact    Ortho Exam    LEFT KNEE:  Erythema: no  Swelling: no  Increased Warmth: no  Tenderness: none  Flexion:   Flexes to 45° in T ROM brace without pain  Extension: intact      Procedures

## 2018-09-03 NOTE — PATIENT INSTRUCTIONS
Explained the patient that I did consult with surgeon Dr CROOKS [facility] who agree with continue to pursue non operative treatment this time  Explained that I cancel her CT scan and that due to evidence of healing on her x-rays I would not think it is appropriate to expose returned radiation of her CT scan at this time  Patient expressed understanding and agreed to plan  Increased T ROM brace to 45° of flexion

## 2018-09-14 DIAGNOSIS — F32.A DEPRESSION, UNSPECIFIED DEPRESSION TYPE: ICD-10-CM

## 2018-09-14 RX ORDER — METHOCARBAMOL 500 MG/1
TABLET, FILM COATED ORAL
Qty: 120 TABLET | Refills: 0 | Status: SHIPPED | OUTPATIENT
Start: 2018-09-14

## 2018-09-18 ENCOUNTER — APPOINTMENT (OUTPATIENT)
Dept: RADIOLOGY | Facility: OTHER | Age: 58
End: 2018-09-18
Payer: COMMERCIAL

## 2018-09-18 ENCOUNTER — OFFICE VISIT (OUTPATIENT)
Dept: OBGYN CLINIC | Facility: OTHER | Age: 58
End: 2018-09-18

## 2018-09-18 VITALS
HEART RATE: 81 BPM | BODY MASS INDEX: 21.53 KG/M2 | HEIGHT: 62 IN | WEIGHT: 117 LBS | SYSTOLIC BLOOD PRESSURE: 172 MMHG | DIASTOLIC BLOOD PRESSURE: 85 MMHG

## 2018-09-18 DIAGNOSIS — S82.035D NONDISPLACED TRANSVERSE FRACTURE OF LEFT PATELLA, SUBSEQUENT ENCOUNTER FOR CLOSED FRACTURE WITH ROUTINE HEALING: Primary | ICD-10-CM

## 2018-09-18 DIAGNOSIS — S82.035D NONDISPLACED TRANSVERSE FRACTURE OF LEFT PATELLA, SUBSEQUENT ENCOUNTER FOR CLOSED FRACTURE WITH ROUTINE HEALING: ICD-10-CM

## 2018-09-18 PROCEDURE — 73564 X-RAY EXAM KNEE 4 OR MORE: CPT

## 2018-09-18 PROCEDURE — 99024 POSTOP FOLLOW-UP VISIT: CPT | Performed by: FAMILY MEDICINE

## 2018-09-18 NOTE — PATIENT INSTRUCTIONS
I explained the patient that since her last visit she does have interval healing of her patella knee cap fracture  Explained that it is not fully healed as of yet however there appears to be enough healing and no tenderness on her kneecap today to allow for starting range-of-motion exercises  I prescribed formal physical therapy to help her with gentle range of motion  At this point time I recommend against resistive exercises  At next follow-up visit will have repeat x-ray in approximately 3 weeks and determine if she is ready for strengthening exercises at that time

## 2018-09-18 NOTE — PROGRESS NOTES
1  Nondisplaced transverse fracture of left patella, subsequent encounter for closed fracture with routine healing  XR knee 4+ vw left injury    [facility]     Orders Placed This Encounter   Procedures    XR knee 4+ vw left injury    [facility]        Imaging Studies (I personally reviewed results in PACS):  X-ray left knee 09/18/2018:  Stable comminuted nondisplaced transverse fracture patella with evidence of increased interval healing since last x-ray    Past diagnostics:  X-ray left knee 08/23/2018:  Stable comminuted nondisplaced transverse fracture patella with minimal interval healing  MRI left knee 07/08/2018:  No acute internal derangement other than nondisplaced transverse patellar fracture  X-ray 06/25/2018 left knee [facility]:  Comminuted nondisplaced transverse fracture patella  Henna Curtis  IMPRESSION:  Left knee comminuted nondisplaced transverse patellar frac ture  History of osteoporosis seen by Endocrinology with multiple fractures in last few years  Date of Injury:  06/24/2018  Follow-up interval: 3 months    T ROM unlocked 09/18/2018  Return in about 3 weeks (around 10/9/2018)  With repeat x-ray  No Merchant or sunrise view  No flexing knee on x-rays      Patient Instructions   I explained the patient that since her last visit she does have interval healing of her patella knee cap fracture  Explained that it is not fully healed as of yet however there appears to be enough healing and no tenderness on her kneecap today to allow for starting range-of-motion exercises  I prescribed formal physical therapy to help her with gentle range of motion  At this point time I recommend against resistive exercises  At next follow-up visit will have repeat x-ray in approximately 3 weeks and determine if she is ready for strengthening exercises at that time           CHIEF COMPLAINT:    Follow-up left patellar fracture    HPI:  Mac Tripp is a 62 y o  female who presents for       Visit  06/27/2018:   Evaluation left knee cap injury in fracture that occurred 06/24/2018 slipping and falling with direct impact on concrete to her left knee cap  Patient was initially evaluated at [address] the next day where she was diagnosed with fracture of her patella  She presents reports showing that she has a comminuted nondisplaced fracture of her patella  She was placed  In straight leg knee brace  Today, she states that her pain is  Improved since her initial injury  Associated symptoms do include spasms of her thigh as well as her calf muscle since her injury  She has been limiting her ambulation however she has been walking on it in her straight leg knee brace  Visit 07/12/2018: Follow-up left knee patellar fracture transverse nondisplaced:  Stable  Improving pain  Patient denies any pain when walking  She does have intermittent spasm in her thigh on occasion mild to moderate intensity that resolved spontaneously on his own  She is having no difficulty walking in her straight leg T ROM brace locked in extension full  She did have MRI ordered last visit which does show that she has no other internal derangement the knee and has an intact quadriceps tendon  Visit 07/26/2018: Follow-up left knee patellar fracture transverse nondisplaced:  4 weeks from date of injury  Patient placed in T ROM brace locked in full extension  Patient compliant with her knee brace  She has not removed  She is kidney in full extension  She states she has intermittent throbbing pain on occasion when walking for long periods time  For the most part she states that she has no pain  Swelling is reduced since last visit  visit 08/09/2018: Follow-up left knee patellar fracture:  Stable  Compliant with T ROM brace locked in full extension  Six weeks and 4 days today from initial injury date    Patient does state she has some rubbing of her brace medial femoral condyle when pointing examination room as well as lateral ankle  Denies any redness discharge swelling  Denies any pain at rest    Walking without  Significant difficulty   In T ROM brace  Visit 08/23/2018: Follow-up left knee patellar fracture:  Stable  Patient states she has some mild soreness anterior knee but overall no pain  She has been compliant with her T ROM brace locked at 30° since her last visit  Visit 08/31/2018: Follow-up left knee patellar fracture:  Stable  Last visit had discussed with patient possibly pursuing CT scan to evaluate for healing  Explained the patient that I did consult with surgeon Dr Juana Head who reviewed x-rays in case history and agreed that there was evidence of healing on x-rays when compared to previous and no need for CT scan at this time  Patient today denies any pain  She has been compliant with the T ROM brace which is currently at 30° flexion  She continues to smoke cigarettes  09/18/2018: Follow-up left knee patellar fracture:  Stable  No pain  Patient still does have stiffness  She has been compliant with her T ROM brace flexion 45°  Review of Systems   Constitutional: Negative for chills and fever  Neurological: Negative for weakness and numbness           Following history reviewed and update:    Past Medical History:   Diagnosis Date    Depression 7/17/2012    History of endometriosis     Osteoporosis     Polycystic ovarian syndrome      Past Surgical History:   Procedure Laterality Date    SHOULDER SURGERY      TOTAL ABDOMINAL HYSTERECTOMY W/ BILATERAL SALPINGOOPHORECTOMY      TUBAL LIGATION       Social History   History   Alcohol Use    Yes     Comment: social     History   Drug Use No     History   Smoking Status    Current Every Day Smoker   Smokeless Tobacco    Never Used     Comment: tobacco use     Family History   Problem Relation Age of Onset    Peripheral vascular disease Mother    Learta January Other Mother peripheral arterial disease    Breast cancer Sister     Breast cancer Maternal Aunt     Hashimoto's thyroiditis Sister      No Known Allergies       Physical Exam  Constitutional:  see vital signs  Gen: well-developed, normocephalic/atraumatic, well-groomed  Eyes: No inflammation or discharge of conjunctiva or lids; sclera clear   Pharynx: no inflammation, lesion, or mass of lips  Neck: supple, no masses, non-distended  MSK: no inflammation, lesion, mass, or clubbing of nails and digits except for other than mentioned below  SKIN: no visible rashes or skin lesions  Pulmonary/Chest: Effort normal  No respiratory distress     NEURO: cranial nerves grossly intact  PSYCH:  Alert and oriented to person, place, and time; recent and remote memory intact; mood normal, no depression, anxiety, or agitation, judgment and insight good and intact    Ortho Exam    LEFT KNEE:  Erythema: no  Swelling: no  Increased Warmth: no  Tenderness: none  Flexion:   100° active  Extension: intact  Patellar Displacement:  Patellar Tilt:  Patellar Apprehension: negative  Patellar Grind Judge's: negative  Lachman's: negative  Drawer: negative  Varus laxity: negative  Valgus laxity: negative  Jorge: negative     Procedures

## 2018-09-26 ENCOUNTER — EVALUATION (OUTPATIENT)
Dept: PHYSICAL THERAPY | Facility: CLINIC | Age: 58
End: 2018-09-26
Payer: COMMERCIAL

## 2018-09-26 DIAGNOSIS — S82.035D CLOSED NONDISPLACED TRANSVERSE FRACTURE OF LEFT PATELLA WITH ROUTINE HEALING: Primary | ICD-10-CM

## 2018-09-26 PROCEDURE — G8991 OTHER PT/OT GOAL STATUS: HCPCS

## 2018-09-26 PROCEDURE — G8990 OTHER PT/OT CURRENT STATUS: HCPCS

## 2018-09-26 PROCEDURE — 97161 PT EVAL LOW COMPLEX 20 MIN: CPT

## 2018-09-26 NOTE — PROGRESS NOTES
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, pain with ambulation/stair climbing, difficulty with transfers  Assessment details: Mariya Hutchins is a 62 y o  female presenting to PT with pain, decreased knee range of motion, decreased LE strength, balance deficits, and decreased tolerance to activity due to healing L patellar fracture  Patient would benefit from skilled PT services to address these impairments and to maximize function in order to improve quality of life  Thank you for the referral   Understanding of Dx/Px/POC: excellent  Goals  STG  1) L knee ROM will improve 50% in 3 weeks  2) L knee strength will improve 1/2 grade in 4 weeks  3) B/L hip strength will improve 1/2 grade in 3 weeks  LTG  1) Patient is independent in HEP  2) Patient will ascend/descend one flight of stairs independently with no increased pain in 8 weeks  3) Ambulation will be improved to PLOF in 8 weeks  4) Patient will be independent in care taking duties with no increase in pain within 8 weeks      Plan  Patient would benefit from: skilled physical therapy  Planned modality interventions: cryotherapy  Planned therapy interventions: manual therapy, muscle pump exercises, neuromuscular re-education, patient education, strengthening, stretching, therapeutic activities, therapeutic exercise, home exercise program, gait training, flexibility and balance/weight bearing training  Frequency: 1-2x per week  Duration in weeks: 8  Treatment plan discussed with: patient        Subjective Evaluation    History of Present Illness  Mechanism of injury: Patient presents after transverse fracture of L patella on 18  She is wearing T ROM brace allowing 0-90 degrees knee flexion  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, and states that she tends to "overdo it" and by the end of the day has more pain in her knee  She is currently ascending/descending stairs with step to step pattern at slow pace  Quality of life: good    Pain  Current pain rating: 3  At best pain ratin  At worst pain ratin  Quality: pressure and dull ache  Relieving factors: ice, heat, medications and relaxation  Aggravating factors: walking, sitting and stair climbing  Progression: improved    Treatments  Previous treatment: immobilization  Current treatment: physical therapy  Patient Goals  Patient goals for therapy: decreased pain, increased motion, improved balance, increased strength, return to sport/leisure activities and independence with ADLs/IADLs          Objective     Observations   Left Knee   Positive for edema  Tenderness   Left Knee   Tenderness in the lateral joint line, medial joint line and patellar tendon       Neurological Testing     Additional Neurological Details  Normal LE neuro screen    Active Range of Motion   Left Knee   Flexion: 85 degrees   Extension: 0 degrees     Passive Range of Motion   Left Knee   Flexion: 90 degrees     Mobility     Additional Mobility Details  Not tested    Strength/Myotome Testing     Additional Strength Details  Not tested due to healing fx      Flowsheet Rows      Most Recent Value   PT/OT G-Codes   Current Score  34   Projected Score  52   FOTO information reviewed  Yes   Assessment Type  Evaluation   G code set  Other PT/OT Primary   Other PT Primary Current Status ()  CL   Other PT Primary Goal Status ()  CK Precautions: follow ROM/resistance precautions, osteoporosis    Daily Treatment Diary     Manual              L knee PROM prn                                                                   Exercise Diary              Quad sets             Glute sets             Heel slides             SAQ             Calf stretch                          LAQ                          Standing march             Standing hip abduction             Standing hip extension             Standing HS curls             HR/TR                                                                   Modalities              CP prn

## 2018-10-04 DIAGNOSIS — R52 PAIN: ICD-10-CM

## 2018-10-04 DIAGNOSIS — F32.A DEPRESSION, UNSPECIFIED DEPRESSION TYPE: ICD-10-CM

## 2018-10-04 RX ORDER — IBUPROFEN 800 MG/1
800 TABLET ORAL
Qty: 90 TABLET | Refills: 1 | Status: SHIPPED | OUTPATIENT
Start: 2018-10-04

## 2018-10-04 RX ORDER — SERTRALINE HYDROCHLORIDE 100 MG/1
150 TABLET, FILM COATED ORAL DAILY
Qty: 45 TABLET | Refills: 3 | Status: SHIPPED | OUTPATIENT
Start: 2018-10-04 | End: 2019-02-10 | Stop reason: SDUPTHER

## 2018-10-09 ENCOUNTER — TELEPHONE (OUTPATIENT)
Dept: OBGYN CLINIC | Facility: HOSPITAL | Age: 58
End: 2018-10-09

## 2018-10-09 NOTE — TELEPHONE ENCOUNTER
Caller: patient  Call back number: [phone]  Patient's doctor: Dr Rossana Rutledge    Patient was supposed to be seen today as a follow up for her left knee which she fractured on 6/25/18  She canceled due to her insurance dropped her and she can not afford it  She is asking to speak to you  She will not give any information other than "it's about my knee, I need to talk to him"

## 2018-10-10 NOTE — TELEPHONE ENCOUNTER
I called patient twice no answer  I did leave message with patient's voice box that did designate her identity and I instructed her to call office back to speak with manager to determine cost out-of-pocket for visit  Please attempt to call patient and inquire if there is anything else we can do for her   thanks

## 2018-10-15 NOTE — TELEPHONE ENCOUNTER
Spoke to AUDRA DE LA TORRE regarding this and she will call patient at some point to see what the issue is

## 2018-10-16 NOTE — TELEPHONE ENCOUNTER
Update as of yesterday per Karla's email regarding this patient:    [place]  She left her insurance lapse as she has switched counties and [place] not had two minutes to work on getting it reinstated   She does state that she can not afford to pay for the visit out of pocket, no matter the cost   She is going to work on getting [employer] reinstated and will call to reschedule as soon as she has done so

## 2018-10-17 NOTE — TELEPHONE ENCOUNTER
I spoke with patient today 10/17/18  She states she is feeling improved overall  No sharp pains  Only mild soreness end of day on occasion  Has been performing range of motion exercises at home and also at water therapy  Confirms not performing resistance exercises yet  I explained to patient that I would like her to have a repeat xray done prior to starting resistance exercises to ensure healing fracture and to prevent any movement of the fracture fragment as a precaution  Patient expressed understanding and agreed to plan  She stated she is in process of rectifying her insurance and will f/u in approximately 2 weeks once she has insurance

## 2018-11-06 ENCOUNTER — TELEPHONE (OUTPATIENT)
Dept: INTERNAL MEDICINE CLINIC | Facility: CLINIC | Age: 58
End: 2018-11-06

## 2018-11-06 DIAGNOSIS — N30.90 CYSTITIS: Primary | ICD-10-CM

## 2018-11-06 RX ORDER — NITROFURANTOIN MACROCRYSTALS 100 MG/1
100 CAPSULE ORAL 4 TIMES DAILY
Qty: 6 CAPSULE | Refills: 0 | Status: SHIPPED | OUTPATIENT
Start: 2018-11-06

## 2019-02-10 DIAGNOSIS — F32.A DEPRESSION, UNSPECIFIED DEPRESSION TYPE: ICD-10-CM

## 2019-02-10 RX ORDER — SERTRALINE HYDROCHLORIDE 100 MG/1
TABLET, FILM COATED ORAL
Qty: 45 TABLET | Refills: 3 | Status: SHIPPED | OUTPATIENT
Start: 2019-02-10 | End: 2019-02-11 | Stop reason: SDUPTHER

## 2019-02-11 DIAGNOSIS — F32.A DEPRESSION, UNSPECIFIED DEPRESSION TYPE: ICD-10-CM

## 2019-02-11 RX ORDER — SERTRALINE HYDROCHLORIDE 100 MG/1
150 TABLET, FILM COATED ORAL DAILY
Qty: 45 TABLET | Refills: 3 | Status: SHIPPED | OUTPATIENT
Start: 2019-02-11 | End: 2019-07-17 | Stop reason: SDUPTHER

## 2019-05-30 DIAGNOSIS — J06.9 VIRAL UPPER RESPIRATORY TRACT INFECTION: Primary | ICD-10-CM

## 2019-05-31 RX ORDER — AZITHROMYCIN 250 MG/1
TABLET, FILM COATED ORAL
Qty: 6 TABLET | Refills: 0 | Status: SHIPPED | OUTPATIENT
Start: 2019-05-31 | End: 2019-06-04

## 2019-07-17 DIAGNOSIS — F32.A DEPRESSION, UNSPECIFIED DEPRESSION TYPE: ICD-10-CM

## 2019-07-17 RX ORDER — SERTRALINE HYDROCHLORIDE 100 MG/1
150 TABLET, FILM COATED ORAL DAILY
Qty: 45 TABLET | Refills: 3 | Status: SHIPPED | OUTPATIENT
Start: 2019-07-17 | End: 2020-01-29 | Stop reason: SDUPTHER

## 2020-01-29 DIAGNOSIS — F32.A DEPRESSION, UNSPECIFIED DEPRESSION TYPE: ICD-10-CM

## 2020-01-29 RX ORDER — SERTRALINE HYDROCHLORIDE 100 MG/1
150 TABLET, FILM COATED ORAL DAILY
Qty: 45 TABLET | Refills: 0 | Status: SHIPPED | OUTPATIENT
Start: 2020-01-29

## 2020-02-25 ENCOUNTER — TELEPHONE (OUTPATIENT)
Dept: INTERNAL MEDICINE CLINIC | Facility: CLINIC | Age: 60
End: 2020-02-25

## 2020-02-25 NOTE — TELEPHONE ENCOUNTER
I called Dwain Myles because she did not show up for her appt today with Dr Clarke Merlin, I was unable to reach the patient and or leave a message  Pt is unable to receive any more refills from our office until she is seen  Last visit was 03 06 2018 with Dr Charis Pallas

## 2024-12-01 NOTE — TELEPHONE ENCOUNTER
Pt has symptoms of a UTI, she tried otc meds but has not felt any relieve, what do you advise? She currently has no insurance, so is unable to come in  Age appropriate behavior